# Patient Record
Sex: FEMALE | Race: WHITE | NOT HISPANIC OR LATINO | Employment: OTHER | URBAN - METROPOLITAN AREA
[De-identification: names, ages, dates, MRNs, and addresses within clinical notes are randomized per-mention and may not be internally consistent; named-entity substitution may affect disease eponyms.]

---

## 2017-08-28 ENCOUNTER — HOSPITAL ENCOUNTER (OUTPATIENT)
Dept: OTHER | Facility: HOSPITAL | Age: 52
Discharge: HOME | End: 2017-08-28
Attending: FAMILY MEDICINE | Admitting: FAMILY MEDICINE

## 2019-04-04 ENCOUNTER — OFFICE (AMBULATORY)
Dept: URBAN - METROPOLITAN AREA PATHOLOGY 4 | Facility: PATHOLOGY | Age: 54
End: 2019-04-04
Payer: COMMERCIAL

## 2019-04-04 ENCOUNTER — ON CAMPUS - OUTPATIENT (AMBULATORY)
Dept: URBAN - METROPOLITAN AREA HOSPITAL 2 | Facility: HOSPITAL | Age: 54
End: 2019-04-04
Payer: COMMERCIAL

## 2019-04-04 VITALS
HEART RATE: 65 BPM | HEART RATE: 70 BPM | SYSTOLIC BLOOD PRESSURE: 121 MMHG | SYSTOLIC BLOOD PRESSURE: 110 MMHG | HEART RATE: 50 BPM | SYSTOLIC BLOOD PRESSURE: 95 MMHG | OXYGEN SATURATION: 98 % | OXYGEN SATURATION: 99 % | HEIGHT: 65 IN | DIASTOLIC BLOOD PRESSURE: 66 MMHG | DIASTOLIC BLOOD PRESSURE: 60 MMHG | RESPIRATION RATE: 16 BRPM | HEART RATE: 59 BPM | OXYGEN SATURATION: 100 % | DIASTOLIC BLOOD PRESSURE: 70 MMHG | TEMPERATURE: 97.5 F | OXYGEN SATURATION: 97 % | HEART RATE: 54 BPM | SYSTOLIC BLOOD PRESSURE: 100 MMHG | WEIGHT: 167 LBS | SYSTOLIC BLOOD PRESSURE: 94 MMHG | HEART RATE: 63 BPM | DIASTOLIC BLOOD PRESSURE: 63 MMHG | DIASTOLIC BLOOD PRESSURE: 59 MMHG | DIASTOLIC BLOOD PRESSURE: 71 MMHG | HEART RATE: 58 BPM | DIASTOLIC BLOOD PRESSURE: 76 MMHG | DIASTOLIC BLOOD PRESSURE: 54 MMHG | SYSTOLIC BLOOD PRESSURE: 109 MMHG | SYSTOLIC BLOOD PRESSURE: 102 MMHG

## 2019-04-04 DIAGNOSIS — K64.1 SECOND DEGREE HEMORRHOIDS: ICD-10-CM

## 2019-04-04 DIAGNOSIS — K62.89 OTHER SPECIFIED DISEASES OF ANUS AND RECTUM: ICD-10-CM

## 2019-04-04 DIAGNOSIS — Z12.11 ENCOUNTER FOR SCREENING FOR MALIGNANT NEOPLASM OF COLON: ICD-10-CM

## 2019-04-04 DIAGNOSIS — D12.2 BENIGN NEOPLASM OF ASCENDING COLON: ICD-10-CM

## 2019-04-04 LAB
GI HISTOLOGY: A. UNSPECIFIED: (no result)
GI HISTOLOGY: PDF REPORT: (no result)

## 2019-04-04 PROCEDURE — 88305 TISSUE EXAM BY PATHOLOGIST: CPT | Mod: 33 | Performed by: INTERNAL MEDICINE

## 2019-04-04 PROCEDURE — 45380 COLONOSCOPY AND BIOPSY: CPT | Mod: 33 | Performed by: INTERNAL MEDICINE

## 2019-12-18 ENCOUNTER — OFFICE VISIT (OUTPATIENT)
Dept: FAMILY MEDICINE CLINIC | Facility: CLINIC | Age: 54
End: 2019-12-18

## 2019-12-18 VITALS
OXYGEN SATURATION: 99 % | SYSTOLIC BLOOD PRESSURE: 132 MMHG | TEMPERATURE: 96.8 F | HEIGHT: 65 IN | BODY MASS INDEX: 29.12 KG/M2 | RESPIRATION RATE: 16 BRPM | WEIGHT: 174.8 LBS | HEART RATE: 55 BPM | DIASTOLIC BLOOD PRESSURE: 60 MMHG

## 2019-12-18 DIAGNOSIS — K21.9 GASTROESOPHAGEAL REFLUX DISEASE, ESOPHAGITIS PRESENCE NOT SPECIFIED: ICD-10-CM

## 2019-12-18 DIAGNOSIS — Z87.891 HISTORY OF TOBACCO USE: ICD-10-CM

## 2019-12-18 DIAGNOSIS — I10 ESSENTIAL HYPERTENSION: ICD-10-CM

## 2019-12-18 DIAGNOSIS — E66.3 OVERWEIGHT: ICD-10-CM

## 2019-12-18 DIAGNOSIS — L40.9 PSORIASIS: Primary | ICD-10-CM

## 2019-12-18 DIAGNOSIS — I47.1 SUPRAVENTRICULAR TACHYCARDIA (HCC): ICD-10-CM

## 2019-12-18 PROBLEM — B37.31 CANDIDIASIS OF VULVA AND VAGINA: Status: ACTIVE | Noted: 2019-12-18

## 2019-12-18 PROBLEM — K58.9 IRRITABLE BOWEL SYNDROME: Status: ACTIVE | Noted: 2019-12-18

## 2019-12-18 PROBLEM — Z12.11 COLON CANCER SCREENING: Status: ACTIVE | Noted: 2019-12-18

## 2019-12-18 PROBLEM — F43.0 ACUTE CRISIS REACTION: Status: ACTIVE | Noted: 2019-12-18

## 2019-12-18 PROBLEM — E78.5 HYPERLIPIDEMIA: Status: ACTIVE | Noted: 2019-12-18

## 2019-12-18 PROBLEM — J30.9 ALLERGIC RHINITIS: Status: ACTIVE | Noted: 2019-12-18

## 2019-12-18 PROCEDURE — 99214 OFFICE O/P EST MOD 30 MIN: CPT | Performed by: FAMILY MEDICINE

## 2019-12-18 RX ORDER — METOPROLOL SUCCINATE 50 MG/1
50 TABLET, EXTENDED RELEASE ORAL DAILY
Refills: 3 | COMMUNITY
Start: 2019-11-25 | End: 2019-12-18 | Stop reason: SDUPTHER

## 2019-12-18 RX ORDER — OLOPATADINE HYDROCHLORIDE 2 MG/ML
SOLUTION/ DROPS OPHTHALMIC
Refills: 12 | COMMUNITY
Start: 2019-11-15 | End: 2020-12-22

## 2019-12-18 RX ORDER — OMEPRAZOLE 40 MG/1
CAPSULE, DELAYED RELEASE ORAL DAILY
COMMUNITY
Start: 2014-10-17 | End: 2020-12-22

## 2019-12-18 RX ORDER — METOPROLOL SUCCINATE 50 MG/1
50 TABLET, EXTENDED RELEASE ORAL DAILY
Qty: 90 TABLET | Refills: 3 | Status: SHIPPED | OUTPATIENT
Start: 2019-12-18 | End: 2020-12-16

## 2020-02-01 ENCOUNTER — TELEPHONE (OUTPATIENT)
Dept: FAMILY MEDICINE CLINIC | Facility: CLINIC | Age: 55
End: 2020-02-01

## 2020-12-04 ENCOUNTER — TELEPHONE (OUTPATIENT)
Dept: FAMILY MEDICINE CLINIC | Facility: CLINIC | Age: 55
End: 2020-12-04

## 2020-12-04 NOTE — TELEPHONE ENCOUNTER
Patient called and said that she sees you once a year and she has an gi on 12/18. She does not want to come in and be exposed and is asking if this can be done in a telephone visit?

## 2020-12-16 DIAGNOSIS — I10 ESSENTIAL HYPERTENSION: ICD-10-CM

## 2020-12-16 RX ORDER — METOPROLOL SUCCINATE 50 MG/1
TABLET, EXTENDED RELEASE ORAL
Qty: 30 TABLET | Refills: 0 | Status: SHIPPED | OUTPATIENT
Start: 2020-12-16 | End: 2020-12-22 | Stop reason: SDUPTHER

## 2020-12-22 ENCOUNTER — OFFICE VISIT (OUTPATIENT)
Dept: FAMILY MEDICINE CLINIC | Facility: CLINIC | Age: 55
End: 2020-12-22

## 2020-12-22 VITALS — WEIGHT: 187 LBS | HEIGHT: 65 IN | BODY MASS INDEX: 31.16 KG/M2

## 2020-12-22 DIAGNOSIS — E66.09 CLASS 1 OBESITY DUE TO EXCESS CALORIES WITH SERIOUS COMORBIDITY AND BODY MASS INDEX (BMI) OF 31.0 TO 31.9 IN ADULT: ICD-10-CM

## 2020-12-22 DIAGNOSIS — I47.1 SUPRAVENTRICULAR TACHYCARDIA (HCC): Primary | ICD-10-CM

## 2020-12-22 DIAGNOSIS — Z87.891 HISTORY OF TOBACCO USE: ICD-10-CM

## 2020-12-22 DIAGNOSIS — L40.9 PSORIASIS: ICD-10-CM

## 2020-12-22 DIAGNOSIS — I10 ESSENTIAL HYPERTENSION: ICD-10-CM

## 2020-12-22 PROBLEM — H04.9: Status: ACTIVE | Noted: 2019-12-20

## 2020-12-22 PROBLEM — E66.811 CLASS 1 OBESITY DUE TO EXCESS CALORIES WITH SERIOUS COMORBIDITY AND BODY MASS INDEX (BMI) OF 31.0 TO 31.9 IN ADULT: Status: ACTIVE | Noted: 2019-12-18

## 2020-12-22 PROCEDURE — 99214 OFFICE O/P EST MOD 30 MIN: CPT | Performed by: FAMILY MEDICINE

## 2020-12-22 RX ORDER — METOPROLOL SUCCINATE 50 MG/1
50 TABLET, EXTENDED RELEASE ORAL DAILY
Qty: 90 TABLET | Refills: 3 | Status: SHIPPED | OUTPATIENT
Start: 2020-12-22 | End: 2021-12-20 | Stop reason: SDUPTHER

## 2020-12-22 RX ORDER — ASPIRIN 81 MG/1
TABLET, CHEWABLE ORAL
COMMUNITY

## 2020-12-22 NOTE — PROGRESS NOTES
Subjective   Corinna Hooks is a 55 y.o. female.     Chief Complaint   Patient presents with   • Rapid Heart Rate       Hypertension  This is a chronic problem. The current episode started more than 1 year ago. The problem is controlled. Associated symptoms include palpitations. Pertinent negatives include no chest pain or shortness of breath. Risk factors for coronary artery disease include obesity. Current antihypertension treatment includes beta blockers. There are no compliance problems.             I personally reviewed and updated the patient's allergies, medications, problem list, and past medical, surgical, social, and family history. I have reviewed and confirmed the accuracy of the History of Present Illness and Review of Symptoms as documented by the MA/LPN/RN. Harris Shane MD    Family History   Problem Relation Age of Onset   • Heart disease Mother    • Diabetes Mother    • Tremor Father    • Heart disease Brother    • Cancer Maternal Aunt         Pancreatic       Social History     Tobacco Use   • Smoking status: Former Smoker     Packs/day: 0.50     Start date: 1987   • Smokeless tobacco: Never Used   • Tobacco comment: quit in 1989   Substance Use Topics   • Alcohol use: Yes     Frequency: Monthly or less   • Drug use: Never       Past Surgical History:   Procedure Laterality Date   • CERVICAL CONIZATION  2000    Comments: CARLOS III   • CHOLECYSTECTOMY  2003   • DILATATION AND CURETTAGE  2000    Of the Uterus.   • TUBAL ABDOMINAL LIGATION Bilateral 2008       Patient Active Problem List   Diagnosis   • Acute crisis reaction   • Allergic rhinitis   • Candidiasis of vulva and vagina   • Colon cancer screening   • Gastroesophageal reflux disease   • Hyperlipidemia   • Irritable bowel syndrome   • Class 1 obesity due to excess calories with serious comorbidity and body mass index (BMI) of 31.0 to 31.9 in adult   • Psoriasis   • Supraventricular tachycardia (CMS/HCC)   • Tinnitus   • Essential  "hypertension   • History of tobacco use   • Disorder of lacrimal system         Current Outpatient Medications:   •  aspirin 81 MG chewable tablet, , Disp: , Rfl:   •  fluocinonide (LIDEX) 0.05 % cream, Apply 1 application topically to the appropriate area as directed Every 8 (Eight) Hours., Disp: 60 g, Rfl: 5  •  metoprolol succinate XL (TOPROL-XL) 50 MG 24 hr tablet, Take 1 tablet by mouth Daily., Disp: 90 tablet, Rfl: 3         Review of Systems   Constitutional: Negative for chills and diaphoresis.   Eyes: Negative for visual disturbance.   Respiratory: Negative for shortness of breath.    Cardiovascular: Positive for palpitations. Negative for chest pain.   Gastrointestinal: Negative for abdominal pain and nausea.   Endocrine: Negative for polydipsia and polyphagia.   Musculoskeletal: Negative for neck stiffness.   Skin: Negative for color change and pallor.   Neurological: Negative for seizures and syncope.   Hematological: Negative for adenopathy.       I have reviewed and confirmed the accuracy of the ROS as documented by the MA/LPN/RN Harris Shane MD      Objective   Ht 165.1 cm (65\")   Wt 84.8 kg (187 lb)   LMP 12/04/2019   Breastfeeding No   BMI 31.12 kg/m²   BP Readings from Last 3 Encounters:   12/18/19 132/60   12/19/18 143/82   12/29/17 132/64     Wt Readings from Last 3 Encounters:   12/22/20 84.8 kg (187 lb)   12/18/19 79.3 kg (174 lb 12.8 oz)   12/19/18 82.8 kg (182 lb 9.6 oz)     Physical Exam    Recent Lab Results:    No results found for: HGBA1C     No results found for: LDL, LDLDIRECT  No results found for: CHOL  No results found for: TRIG  No results found for: HDL  No results found for: PSA  No results found for: WBC, HGB, HCT, MCV, PLT  No results found for: TSH, C2RMVIR, U6QKKMZ   No results found for: GLUCOSE, BUN, CREATININE, EGFRIFNONA, EGFRIFAFRI, BCR, K, CO2, CALCIUM, PROTENTOTREF, ALBUMIN, LABIL2, BILIRUBIN, AST, ALT  No results found for: THUAN, RF, SEDRATE   No results found " for: CRP   No results found for: IRON, TIBC, FERRITIN   No results found for: [id number]     Corinna Hooks consented to undergo a telephone visit today.  This format was necessitated by the current covid-19 virus pandemic.  22 minutes was spent on the phone today with Corinna discussing her acute concerns and chronic medical problems.  Assessment/Plan      Medications        Problem List         LOS    Hypertension.  Stable on metoprolol.  Discussed low-sodium diet.  Recommend blood work she states her OB/GYN draws for screening blood work for her.  SVT.  Clinically improved on Toprol currently, infrequent breakthrough episodes.  Avoid caffeine.   Follow-up recheck.  Stress testing/echo benign 2017.  Followed by Dr. Linda, consider cardiology follow-up.  Meniscus tear.  Much improved status post repair per Ortho Dr. Oliveira.  Psoriasis.  Stable on topical Rx.  Menopausal syndrome.  Discussed calcium and vitamin D.  Followed by OB/GYN, mammogram current.  Colon cancer screening.  Colonoscopy per [place] 2019 with polypectomy by 1.  Hyperlipidemia.  Mild elevation per her report.  Recommend fasting blood work she states her OB/GYN checks her cholesterol for her.  We will attempt to get records.  GERD.  Doing well on PPI.  Discussed calcium vitamin D.  Longstanding, recommend GI referral/EGD she states she will consider.  Recommend follow-up visit for comprehensive physical, screening tests      Problem List Items Addressed This Visit        Unprioritized    Class 1 obesity due to excess calories with serious comorbidity and body mass index (BMI) of 31.0 to 31.9 in adult    Psoriasis    Overview     stabke  Topical care discussed.         Relevant Medications    fluocinonide (LIDEX) 0.05 % cream    Supraventricular tachycardia (CMS/HCC) - Primary    Relevant Medications    metoprolol succinate XL (TOPROL-XL) 50 MG 24 hr tablet    Essential hypertension    Relevant Medications    metoprolol succinate XL (TOPROL-XL) 50 MG  24 hr tablet    History of tobacco use              Expected course, medications, and adverse effects discussed.  Call or return if worsening or persistent symptoms.  The complete contents of the Assessment and Plan as documented above have been reviewed and addressed by myself with the patient today as part of an ongoing evaluation / treatment plan.  If some of the documentation has been copied from a previous note and is unchanged it indicates that this problem / plan has been assessed today but is stable from a previous visit and no changes have been recommended.

## 2020-12-26 ENCOUNTER — TELEPHONE (OUTPATIENT)
Dept: FAMILY MEDICINE CLINIC | Facility: CLINIC | Age: 55
End: 2020-12-26

## 2021-01-19 DIAGNOSIS — K21.9 GASTROESOPHAGEAL REFLUX DISEASE, UNSPECIFIED WHETHER ESOPHAGITIS PRESENT: Primary | ICD-10-CM

## 2021-01-19 RX ORDER — OMEPRAZOLE 40 MG/1
40 CAPSULE, DELAYED RELEASE ORAL DAILY
Qty: 90 CAPSULE | Refills: 3 | Status: SHIPPED | OUTPATIENT
Start: 2021-01-19 | End: 2021-12-20 | Stop reason: SDUPTHER

## 2021-07-14 ENCOUNTER — OFFICE VISIT (OUTPATIENT)
Dept: FAMILY MEDICINE CLINIC | Facility: CLINIC | Age: 56
End: 2021-07-14

## 2021-07-14 VITALS
HEIGHT: 65 IN | WEIGHT: 190.4 LBS | OXYGEN SATURATION: 99 % | SYSTOLIC BLOOD PRESSURE: 114 MMHG | HEART RATE: 99 BPM | BODY MASS INDEX: 31.72 KG/M2 | DIASTOLIC BLOOD PRESSURE: 80 MMHG | RESPIRATION RATE: 18 BRPM | TEMPERATURE: 97.1 F

## 2021-07-14 DIAGNOSIS — R07.9 CHEST PAIN, UNSPECIFIED TYPE: ICD-10-CM

## 2021-07-14 DIAGNOSIS — R10.2 PELVIC PAIN: ICD-10-CM

## 2021-07-14 DIAGNOSIS — Z01.818 PRE-OP EXAM: Primary | ICD-10-CM

## 2021-07-14 DIAGNOSIS — Z87.891 HISTORY OF TOBACCO USE: ICD-10-CM

## 2021-07-14 DIAGNOSIS — E66.09 CLASS 1 OBESITY DUE TO EXCESS CALORIES WITH SERIOUS COMORBIDITY AND BODY MASS INDEX (BMI) OF 31.0 TO 31.9 IN ADULT: ICD-10-CM

## 2021-07-14 PROCEDURE — 99213 OFFICE O/P EST LOW 20 MIN: CPT | Performed by: FAMILY MEDICINE

## 2021-07-14 PROCEDURE — 93000 ELECTROCARDIOGRAM COMPLETE: CPT | Performed by: FAMILY MEDICINE

## 2021-07-14 NOTE — PROGRESS NOTES
Subjective   Corinna Hooks is a 56 y.o. female.     Chief Complaint   Patient presents with   • pre-op clearence     DNC @ [facility]    • Pelvic Pain       Pre-Operative Exam:  The procedure scheduled is a DNC . The surgery is scheduled for 7/30/2021 and is to be done at [facility]. The surgeon is Anitra Hargrove. Chief complaint is pelvic pain and polyps.. Recent symptoms include pelvic pain. Pertinent medical history includes fibroid tumors and thickened endometrial lining, had CIN3 in 2001 and had a cold knife comb with DNC at that time and reports no issues since. Pertinent family history includes ovarian cancer. After surgery the patient plans to recover at home with family.   Pelvic Pain  The patient's primary symptoms include pelvic pain. This is a chronic problem. The current episode started more than 1 year ago. The problem occurs intermittently. The problem has been waxing and waning. The patient is experiencing no pain. She is not pregnant. Pertinent negatives include no abdominal pain, anorexia, back pain, chills, constipation, diarrhea, discolored urine, dysuria, fever, flank pain, frequency, headaches, hematuria, joint pain, joint swelling, nausea, painful intercourse, rash, sore throat, urgency or vomiting. Nothing aggravates the symptoms. She has tried nothing for the symptoms. The treatment provided no relief. She uses tubal ligation for contraception. Her past medical history is significant for ovarian cysts.            I personally reviewed and updated the patient's allergies, medications, problem list, and past medical, surgical, social, and family history. I have reviewed and confirmed the accuracy of the History of Present Illness and Review of Symptoms as documented by the MA/LISA/RN. Harris Shane MD    Family History   Problem Relation Age of Onset   • Heart disease Mother    • Diabetes Mother    • Tremor Father    • Heart disease Brother    • Cancer Maternal Aunt          Pancreatic       Social History     Tobacco Use   • Smoking status: Former Smoker     Packs/day: 0.50     Start date:      Quit date:      Years since quittin.5   • Smokeless tobacco: Never Used   • Tobacco comment: quit in    Vaping Use   • Vaping Use: Never used   Substance Use Topics   • Alcohol use: Yes   • Drug use: Never       Past Surgical History:   Procedure Laterality Date   • CERVICAL CONIZATION      Comments: CARLOS III   • CHOLECYSTECTOMY     • DILATATION AND CURETTAGE      Of the Uterus.   • KNEE SURGERY Left    • TUBAL ABDOMINAL LIGATION Bilateral        Patient Active Problem List   Diagnosis   • Acute crisis reaction   • Allergic rhinitis   • Candidiasis of vulva and vagina   • Colon cancer screening   • Gastroesophageal reflux disease   • Hyperlipidemia   • Irritable bowel syndrome   • Class 1 obesity due to excess calories with serious comorbidity and body mass index (BMI) of 31.0 to 31.9 in adult   • Psoriasis   • Supraventricular tachycardia (CMS/HCC)   • Tinnitus   • Essential hypertension   • History of tobacco use   • Disorder of lacrimal system         Current Outpatient Medications:   •  aspirin 81 MG chewable tablet, , Disp: , Rfl:   •  metoprolol succinate XL (TOPROL-XL) 50 MG 24 hr tablet, Take 1 tablet by mouth Daily., Disp: 90 tablet, Rfl: 3  •  omeprazole (priLOSEC) 40 MG capsule, Take 1 capsule by mouth Daily. (Patient taking differently: Take 40 mg by mouth As Needed.), Disp: 90 capsule, Rfl: 3         Review of Systems   Constitutional: Negative for chills, diaphoresis and fever.   HENT: Negative for sore throat.    Eyes: Negative for visual disturbance.   Respiratory: Negative for shortness of breath.    Cardiovascular: Negative for chest pain and palpitations.   Gastrointestinal: Negative for abdominal pain, anorexia, constipation, diarrhea, nausea and vomiting.   Endocrine: Negative for polydipsia and polyphagia.   Genitourinary: Positive for  "pelvic pain. Negative for dysuria, flank pain, frequency, hematuria and urgency.   Musculoskeletal: Negative for back pain, joint pain and neck stiffness.   Skin: Negative for color change, pallor and rash.   Neurological: Negative for seizures and syncope.   Hematological: Negative for adenopathy.   Psychiatric/Behavioral: Negative for hallucinations and suicidal ideas.       I have reviewed and confirmed the accuracy of the ROS as documented by the MA/LPN/RN Harris Shane MD      Objective   /80   Pulse 99   Temp 97.1 °F (36.2 °C)   Resp 18   Ht 165.1 cm (65\")   Wt 86.4 kg (190 lb 6.4 oz)   LMP 12/04/2019   SpO2 99%   BMI 31.68 kg/m²   BP Readings from Last 3 Encounters:   07/14/21 114/80   12/18/19 132/60   12/19/18 143/82     Wt Readings from Last 3 Encounters:   07/14/21 86.4 kg (190 lb 6.4 oz)   12/22/20 84.8 kg (187 lb)   12/18/19 79.3 kg (174 lb 12.8 oz)     Physical Exam  Constitutional:       Appearance: Normal appearance. She is well-developed. She is not diaphoretic.   Cardiovascular:      Rate and Rhythm: Normal rate and regular rhythm.      Pulses: Normal pulses.      Heart sounds: Normal heart sounds, S1 normal and S2 normal. No murmur heard.   No friction rub. No gallop.    Pulmonary:      Effort: Pulmonary effort is normal. No accessory muscle usage.      Breath sounds: Normal breath sounds. No stridor. No decreased breath sounds, wheezing, rhonchi or rales.   Abdominal:      General: Bowel sounds are normal. There is no distension.      Palpations: Abdomen is soft. Abdomen is not rigid. There is no mass or pulsatile mass.      Tenderness: There is no abdominal tenderness. There is no guarding or rebound. Negative signs include Allen's sign.      Hernia: No hernia is present.   Skin:     General: Skin is warm and dry.      Coloration: Skin is not pale.   Neurological:      Mental Status: She is alert and oriented to person, place, and time.      Coordination: Coordination normal.     "  Gait: Gait normal.         Data / Lab Results:    No results found for: HGBA1C     No results found for: LDL, LDLDIRECT  No results found for: CHOL  No results found for: TRIG  No results found for: HDL  No results found for: PSA  No results found for: WBC, HGB, HCT, MCV, PLT  No results found for: TSH, [id number], I4PEWXV   No results found for: GLUCOSE, BUN, CREATININE, EGFRIFNONA, EGFRIFAFRI, BCR, K, CO2, CALCIUM, PROTENTOTREF, ALBUMIN, LABIL2, BILIRUBIN, AST, ALT  No results found for: THUAN, RF, SEDRATE   No results found for: CRP   No results found for: IRON, TIBC, FERRITIN   No results found for: [id number]       Assessment/Plan      Medications        Problem List         LOS    Preop clearance.  She has an upcoming D&C planned secondary to abnormal pelvic ultrasound.  EKG benign today.  Has upcoming screening blood work scheduled per OB/GYN.  Benign exam today.  Cleared for surgery.  Hypertension.  Stable on metoprolol.  Discussed low-sodium diet.  Recommend blood work she states her OB/GYN draws for screening blood work for her.  SVT.  Clinically improved on Toprol currently, infrequent breakthrough episodes.  Avoid caffeine.   Follow-up recheck.  Stress testing/echo benign 2017.  Followed by Dr. Linda, consider cardiology follow-up.  Meniscus tear.  Much improved status post repair per Ortho Dr. Oliveira.  Psoriasis.  Stable on topical Rx.  Menopausal syndrome.  Discussed calcium and vitamin D.  Followed by OB/GYN, mammogram current.  Colon cancer screening.  Colonoscopy per [facility] 2019 with polypectomy by 1.  Hyperlipidemia.  Mild elevation per her report.  Recommend fasting blood work she states her OB/GYN checks her cholesterol for her.  We will attempt to get records.  GERD.  Doing well on PPI.  Discussed calcium vitamin D.  Longstanding, recommend GI referral/EGD she states she will consider.  Recommend follow-up visit for comprehensive physical, screening tests        Diagnoses and all orders for this  visit:    1. Pre-op exam (Primary)    2. Pelvic pain    3. History of tobacco use    4. Class 1 obesity due to excess calories with serious comorbidity and body mass index (BMI) of 31.0 to 31.9 in adult    5. Chest pain, unspecified type  -     Cancel: ECG 12 Lead; Future  -     ECG 12 Lead              Expected course, medications, and adverse effects discussed.  Call or return if worsening or persistent symptoms.  I wore protective equipment throughout this patient encounter including a mask, gloves, and eye protection.  Hand hygiene was performed before donning protective equipment and after removal when leaving the room. The complete contents of the Assessment and Plan and Data/Lab Results as documented above have been reviewed and addressed by myself with the patient today as part of an ongoing evaluation / treatment plan.  If some of the documentation has been copied from a previous note and is unchanged it indicates that this problem / plan has been assessed today but is stable from a previous visit and no changes have been recommended.

## 2021-08-11 ENCOUNTER — TELEPHONE (OUTPATIENT)
Dept: FAMILY MEDICINE CLINIC | Facility: CLINIC | Age: 56
End: 2021-08-11

## 2021-12-20 ENCOUNTER — OFFICE VISIT (OUTPATIENT)
Dept: FAMILY MEDICINE CLINIC | Facility: CLINIC | Age: 56
End: 2021-12-20

## 2021-12-20 VITALS
WEIGHT: 204 LBS | DIASTOLIC BLOOD PRESSURE: 70 MMHG | BODY MASS INDEX: 33.99 KG/M2 | HEIGHT: 65 IN | SYSTOLIC BLOOD PRESSURE: 102 MMHG | HEART RATE: 84 BPM | RESPIRATION RATE: 18 BRPM | OXYGEN SATURATION: 98 % | TEMPERATURE: 97.1 F

## 2021-12-20 DIAGNOSIS — Z00.01 ENCOUNTER FOR WELL ADULT EXAM WITH ABNORMAL FINDINGS: Primary | ICD-10-CM

## 2021-12-20 DIAGNOSIS — L40.9 PSORIASIS: ICD-10-CM

## 2021-12-20 DIAGNOSIS — I47.1 SUPRAVENTRICULAR TACHYCARDIA (HCC): ICD-10-CM

## 2021-12-20 DIAGNOSIS — K21.9 GASTROESOPHAGEAL REFLUX DISEASE, UNSPECIFIED WHETHER ESOPHAGITIS PRESENT: ICD-10-CM

## 2021-12-20 DIAGNOSIS — Z12.11 COLON CANCER SCREENING: ICD-10-CM

## 2021-12-20 DIAGNOSIS — Z87.891 HISTORY OF TOBACCO USE: ICD-10-CM

## 2021-12-20 DIAGNOSIS — I10 ESSENTIAL HYPERTENSION: ICD-10-CM

## 2021-12-20 DIAGNOSIS — E66.09 CLASS 1 OBESITY DUE TO EXCESS CALORIES WITH SERIOUS COMORBIDITY AND BODY MASS INDEX (BMI) OF 31.0 TO 31.9 IN ADULT: ICD-10-CM

## 2021-12-20 PROCEDURE — 99396 PREV VISIT EST AGE 40-64: CPT | Performed by: FAMILY MEDICINE

## 2021-12-20 PROCEDURE — 99214 OFFICE O/P EST MOD 30 MIN: CPT | Performed by: FAMILY MEDICINE

## 2021-12-20 RX ORDER — MELOXICAM 15 MG/1
TABLET ORAL
COMMUNITY
Start: 2021-10-11 | End: 2022-12-21

## 2021-12-20 RX ORDER — METOPROLOL SUCCINATE 50 MG/1
50 TABLET, EXTENDED RELEASE ORAL DAILY
Qty: 90 TABLET | Refills: 3 | Status: SHIPPED | OUTPATIENT
Start: 2021-12-20 | End: 2022-05-16 | Stop reason: SDUPTHER

## 2021-12-20 RX ORDER — OMEPRAZOLE 40 MG/1
40 CAPSULE, DELAYED RELEASE ORAL DAILY
Qty: 90 CAPSULE | Refills: 3 | Status: SHIPPED | OUTPATIENT
Start: 2021-12-20 | End: 2022-12-21 | Stop reason: SDUPTHER

## 2021-12-20 RX ORDER — ERYTHROMYCIN 5 MG/G
OINTMENT OPHTHALMIC
COMMUNITY
Start: 2021-11-04 | End: 2022-12-21

## 2021-12-20 NOTE — PROGRESS NOTES
Subjective   Corinna Hooks is a 57 y.o. female.     Chief Complaint   Patient presents with   • Annual Exam   • Hypertension       The patient is here: for coordination of medical care to discuss health maintenance and disease prevention to follow up on multiple medical problems.  Last comprehensive physical is unknown.  Previous physical was performed by  Harris Shane MD  Overall has: moderate activity with work/home activities, good appetite, decreased energy level and is sleeping poorly. Nutrition: eating a variety of foods. Last tetanus shot was 10/28/2013. Patient is doing routine self breast exams: monthly Last colonoscopy was 19 at [facility] by Dr Jaffe and was reported with benign polyps. Her last pap smear was May 2021    Hypertension  This is a chronic problem. The current episode started more than 1 year ago. The problem is controlled. Associated symptoms include palpitations. Pertinent negatives include no chest pain or shortness of breath. Risk factors for coronary artery disease include obesity. Current antihypertension treatment includes beta blockers. There are no compliance problems.         Recent Hospitalizations:  No hospitalization(s) within the last year..  ccc      I personally reviewed and updated the patient's allergies, medications, problem list, and past medical, surgical, social, and family history. I have reviewed and confirmed the accuracy of the HPI and ROS as documented by the MA/LPN/RN Harris Shane MD    Family History   Problem Relation Age of Onset   • Heart disease Mother    • Diabetes Mother    • Tremor Father    • Heart disease Brother    • Cancer Maternal Aunt         Pancreatic       Social History     Tobacco Use   • Smoking status: Former Smoker     Packs/day: 0.50     Start date:      Quit date:      Years since quittin.7   • Smokeless tobacco: Never Used   • Tobacco comment: quit in    Vaping Use   • Vaping Use: Never used   Substance  Use Topics   • Alcohol use: Yes   • Drug use: Never       Past Surgical History:   Procedure Laterality Date   • CERVICAL CONIZATION  2000    Comments: CARLOS III   • CHOLECYSTECTOMY  2003   • D & C WITH SUCTION  07/2021   • DILATATION AND CURETTAGE  2000    Of the Uterus.   • KNEE SURGERY Left    • TUBAL ABDOMINAL LIGATION Bilateral 2008       Patient Active Problem List   Diagnosis   • Acute crisis reaction   • Allergic rhinitis   • Candidiasis of vulva and vagina   • Colon cancer screening   • Gastroesophageal reflux disease   • Hyperlipidemia   • Irritable bowel syndrome   • Class 1 obesity due to excess calories with serious comorbidity and body mass index (BMI) of 31.0 to 31.9 in adult   • Psoriasis   • Supraventricular tachycardia (HCC)   • Tinnitus   • Essential hypertension   • History of tobacco use   • Disorder of lacrimal system   • Encounter for well adult exam with abnormal findings         Current Outpatient Medications:   •  aspirin 81 MG chewable tablet, , Disp: , Rfl:   •  meloxicam (MOBIC) 15 MG tablet, , Disp: , Rfl:   •  omeprazole (priLOSEC) 40 MG capsule, Take 1 capsule by mouth Daily., Disp: 90 capsule, Rfl: 3  •  erythromycin (ROMYCIN) 5 MG/GM ophthalmic ointment, , Disp: , Rfl:   •  fluocinonide (LIDEX) 0.05 % cream, Apply 1 application topically to the appropriate area as directed Every 8 (Eight) Hours., Disp: 60 g, Rfl: 5  •  metoprolol succinate XL (TOPROL-XL) 50 MG 24 hr tablet, Take 1 tablet by mouth Daily., Disp: 90 tablet, Rfl: 3    Review of Systems   Constitutional: Negative for chills and diaphoresis.   HENT: Negative for trouble swallowing and voice change.    Eyes: Negative for visual disturbance.   Respiratory: Negative for shortness of breath.    Cardiovascular: Positive for palpitations. Negative for chest pain.   Gastrointestinal: Negative for abdominal pain and nausea.   Endocrine: Negative for polydipsia and polyphagia.   Genitourinary: Negative for hematuria.  "  Musculoskeletal: Negative for neck stiffness.   Skin: Negative for color change and pallor.   Allergic/Immunologic: Negative for immunocompromised state.   Neurological: Negative for seizures and syncope.   Hematological: Negative for adenopathy.   Psychiatric/Behavioral: Negative for sleep disturbance and suicidal ideas.       I have reviewed and confirmed the accuracy of the ROS as documented by the MA/LPN/RN Harris Shane MD      Objective   /70   Pulse 84   Temp 97.1 °F (36.2 °C)   Resp 18   Ht 165.1 cm (65\")   Wt 92.5 kg (204 lb)   LMP 12/04/2019   SpO2 98%   Breastfeeding No   BMI 33.95 kg/m²   BP Readings from Last 3 Encounters:   12/20/21 102/70   07/14/21 114/80   12/18/19 132/60     Wt Readings from Last 3 Encounters:   12/20/21 92.5 kg (204 lb)   07/14/21 86.4 kg (190 lb 6.4 oz)   12/22/20 84.8 kg (187 lb)     Physical Exam  Constitutional:       Appearance: She is well-developed. She is not diaphoretic.   HENT:      Head: Normocephalic.      Right Ear: Tympanic membrane, ear canal and external ear normal.      Left Ear: Tympanic membrane, ear canal and external ear normal.      Nose: Nose normal.   Eyes:      General: Lids are normal.      Conjunctiva/sclera: Conjunctivae normal.      Pupils: Pupils are equal, round, and reactive to light.   Neck:      Thyroid: No thyromegaly.      Vascular: No carotid bruit or JVD.      Trachea: No tracheal deviation.   Cardiovascular:      Rate and Rhythm: Normal rate and regular rhythm.      Heart sounds: Normal heart sounds. No murmur heard.  No friction rub. No gallop.    Pulmonary:      Effort: Pulmonary effort is normal.      Breath sounds: Normal breath sounds. No stridor. No decreased breath sounds, wheezing or rales.   Abdominal:      General: Bowel sounds are normal. There is no distension.      Palpations: Abdomen is soft. There is no mass.      Tenderness: There is no abdominal tenderness. There is no guarding or rebound.      Hernia: No " hernia is present.   Lymphadenopathy:      Head:      Right side of head: No submental, submandibular, tonsillar, preauricular, posterior auricular or occipital adenopathy.      Left side of head: No submental, submandibular, tonsillar, preauricular, posterior auricular or occipital adenopathy.      Cervical: No cervical adenopathy.   Skin:     General: Skin is warm and dry.      Coloration: Skin is not pale.   Neurological:      Mental Status: She is alert and oriented to person, place, and time.      Cranial Nerves: No cranial nerve deficit.      Sensory: No sensory deficit.      Coordination: Coordination normal.      Gait: Gait normal.      Deep Tendon Reflexes: Reflexes are normal and symmetric.         Data / Lab Results:    No results found for: HGBA1C     No results found for: LDL, LDLDIRECT  No results found for: CHOL  No results found for: TRIG  No results found for: HDL  No results found for: PSA  No results found for: WBC, HGB, HCT, MCV, PLT  No results found for: TSH, N2XCFXU, V4ZUOZK   No results found for: GLUCOSE, BUN, CREATININE, EGFRIFNONA, EGFRIFAFRI, BCR, K, CO2, CALCIUM, PROTENTOTREF, ALBUMIN, LABIL2, BILIRUBIN, AST, ALT  No results found for: THUAN, RF, SEDRATE   No results found for: CRP   No results found for: IRON, TIBC, FERRITIN   No results found for: [id number]     Age-appropriate Screening Schedule:  Refer to the list below for future screening recommendations based on patient's age, sex and/or medical conditions. Orders for these recommended tests are listed in the plan section. The patient has been provided with a written plan.    Health Maintenance   Topic Date Due   • ZOSTER VACCINE (1 of 2) Never done   • LIPID PANEL  Never done   • PAP SMEAR  12/21/2021   • INFLUENZA VACCINE  10/01/2022   • MAMMOGRAM  08/25/2023   • TDAP/TD VACCINES (2 - Td or Tdap) 10/28/2023           Assessment & Plan      Medications        Problem List         LOS    Physical.  Doing well, vaccines current.   Discussed coated baby aspirin daily.  Discussed calcium and vitamin D.  Discussed health maintenance, screening test, and lifestyle modification.  Followed by OB/GYN Dr. Alina Rivera, mammogram current.  Uterine polyp.  Status post D&C 6/29, path benign, followed by OB/GYN.  SVT.  Clinically improved on Toprol currently, infrequent breakthrough episodes.  Avoid caffeine.   Follow-up recheck.  Stress testing/echo benign 2017.  Followed by Dr. Linda, consider cardiology follow-up.  OA knee.  Persistent pain, stiffness.  History of Meniscus tear, status post repair per Ortho Dr. Oliveira.  Psoriasis.  Stable on topical Rx.  Menopausal syndrome.  Discussed calcium and vitamin D.  Followed by OB/GYN, mammogram current.  Colon cancer screening.  Colonoscopy per [facility] 2019 with polypectomy by 1.  Repeat planned 5 years.  Hyperlipidemia.  Mild elevation per her report.  Recommend fasting blood work she states her OB/GYN checks her cholesterol for her.  We will attempt to get records.  GERD.  Doing well on PPI.  Discussed calcium vitamin D.  Longstanding, recommend GI referral/EGD she states she will consider.        Diagnoses and all orders for this visit:    1. Encounter for well adult exam with abnormal findings (Primary)    2. Class 1 obesity due to excess calories with serious comorbidity and body mass index (BMI) of 31.0 to 31.9 in adult  Assessment & Plan:  Patient's (Body mass index is 33.95 kg/m².) indicates that they are obese (BMI >30) with health conditions that include hypertension, dyslipidemias and GERD . Weight is unchanged. BMI is is above average; BMI management plan is completed. We discussed portion control and increasing exercise.       3. History of tobacco use    4. Colon cancer screening    5. Psoriasis  -     fluocinonide (LIDEX) 0.05 % cream; Apply 1 application topically to the appropriate area as directed Every 8 (Eight) Hours.  Dispense: 60 g; Refill: 5    6. Gastroesophageal reflux disease,  unspecified whether esophagitis present  -     omeprazole (priLOSEC) 40 MG capsule; Take 1 capsule by mouth Daily.  Dispense: 90 capsule; Refill: 3    7. Essential hypertension  -     Discontinue: metoprolol succinate XL (TOPROL-XL) 50 MG 24 hr tablet; Take 1 tablet by mouth Daily.  Dispense: 90 tablet; Refill: 3    8. Supraventricular tachycardia (HCC)            Expected course, medications, and adverse effects discussed.  Call or return if worsening or persistent symptoms.  I wore protective equipment throughout this patient encounter including a mask, gloves, and eye protection.  Hand hygiene was performed before donning protective equipment and after removal when leaving the room. The complete contents of the Assessment and Plan and Data / Lab Results as documented above have been reviewed and addressed by myself with the patient today as part of an ongoing evaluation / treatment plan.  If some of the documentation has been copied from a previous note and is unchanged it indicates that this problem / plan has been assessed today but is stable from a previous visit and no changes have been recommended.

## 2021-12-29 ENCOUNTER — TELEPHONE (OUTPATIENT)
Dept: FAMILY MEDICINE CLINIC | Facility: CLINIC | Age: 56
End: 2021-12-29

## 2021-12-29 NOTE — TELEPHONE ENCOUNTER
----- Message from Corinna Hooks sent at 12/28/2021  4:42 PM EST -----  Regarding: Diagnoses question   Hi Dr Shane, this certainly is not a complaint,however it was the only “subject” to choose from to address this concern.     I reviewed my visit summaries and noticed on Dec 18, 2019 as well as Dec 20, 2021 I have been diagnosed with essential hypertension. I’ve never been told this. I’ve had to correct the medical assistants who do my vitals before you see me that I take Toprol for my SVT only. Every single time they ask me how my Toprol is helping my high blood pressure-then I have to remind them it’s for my SVT.     Also, I’ve never had high blood cholesterol or vulvar candidiasis, to my knowledge OR acute crisis. All of these are my “current diagnoses” according to my visit in 2019 and just last week.     Can you please review and remove these diagnoses? Please respond and let me know what’s going on. Thanks and Happy New Year! Corinna Hooks

## 2022-05-16 DIAGNOSIS — I10 ESSENTIAL HYPERTENSION: ICD-10-CM

## 2022-05-16 RX ORDER — METOPROLOL SUCCINATE 50 MG/1
50 TABLET, EXTENDED RELEASE ORAL DAILY
Qty: 90 TABLET | Refills: 3 | Status: SHIPPED | OUTPATIENT
Start: 2022-05-16 | End: 2022-12-21 | Stop reason: SDUPTHER

## 2022-05-16 NOTE — TELEPHONE ENCOUNTER
Caller: Corinna Hooks    Relationship: Self    Best call back number: [phone]    Requested Prescriptions:   Requested Prescriptions     Pending Prescriptions Disp Refills   • metoprolol succinate XL (TOPROL-XL) 50 MG 24 hr tablet 90 tablet 3     Sig: Take 1 tablet by mouth Daily.        Pharmacy where request should be sent: [facility] #[id number] - [place], [place] [address] - [phone]  - [phone]     Additional details provided by patient: ONLY TWO PILLS LEFT/NEEDING 1 MONTH SENT IN TO LOCAL PHARMACY    DID NOT GET HOME SHIPMENT    Does the patient have less than a 3 day supply:  [x] Yes  [] No    Radha Teran, Nuno Rep   05/16/22 09:21 EDT

## 2022-12-20 PROBLEM — Z12.39 SCREENING FOR MALIGNANT NEOPLASM OF BREAST: Status: ACTIVE | Noted: 2022-12-20

## 2022-12-20 NOTE — PROGRESS NOTES
Subjective   Corinna Hooks is a 57 y.o. female.     Chief Complaint   Patient presents with   • Annual Exam   • Hypertension   • Hyperlipidemia       The patient is here: to discuss health maintenance and disease prevention to follow up on multiple medical problems.  Last comprehensive physical was on 12/20/2021.  Previous physical was performed by  Harris Shane MD  Overall has: moderate activity with work/home activities, good appetite, good energy level, is sleeping well and returned to full activity. Nutrition: appropriate balanced diet, balanced diet and eating a variety of foods. Last tetanus shot was 10/28/2013. Patient is doing routine self breast exams: monthly Patient's last Pap Smear was on 12/21/2018.     Last Completed Mammogram          MAMMOGRAM (Every 2 Years) Next due on 8/25/2023 08/25/2021  SCANNED - MAMMO    08/24/2021  Outside Procedure: HC MAMMOGRAM SCREENING BILAT DIGITAL W CAD    11/06/2013   Mammogram component of  MAMMOGRAPHY    11/06/2013  SCANNED - MAMMO    04/04/2012  SCANNED - MAMMO    Only the first 5 history entries have been loaded, but more history exists.               Last Completed Colonoscopy          COLORECTAL CANCER SCREENING (COLONOSCOPY - Every 10 Years) Next due on 4/4/2029 04/04/2019   Colonoscopy component of  COLONOSCOPY    04/04/2019  SCANNED - COLONOSCOPY                Hypertension  This is a chronic problem. The current episode started more than 1 year ago. The problem is controlled. Associated symptoms include palpitations. Pertinent negatives include no chest pain or shortness of breath. Risk factors for coronary artery disease include obesity. Current antihypertension treatment includes beta blockers. There are no compliance problems.    Hyperlipidemia  This is a chronic problem. The current episode started more than 1 year ago. Pertinent negatives include no chest pain or shortness of breath. Current antihyperlipidemic treatment includes diet  change and exercise. Risk factors for coronary artery disease include hypertension and obesity.        Recent Hospitalizations:  No hospitalization(s) within the last year..  ccc      I personally reviewed and updated the patient's allergies, medications, problem list, and past medical, surgical, social, and family history. I have reviewed and confirmed the accuracy of the HPI and ROS as documented by the MA/LPN/RN Harris Shane MD    Family History   Problem Relation Age of Onset   • Heart disease Mother    • Diabetes Mother    • Hyperlipidemia Mother    • Tremor Father    • Stroke Father    • Heart disease Brother    • Cancer Maternal Aunt         Pancreatic       Social History     Tobacco Use   • Smoking status: Former     Packs/day: 0.50     Years: 2.00     Pack years: 1.00     Types: Cigarettes     Start date: 1987     Quit date: 1989     Years since quittin.0   • Smokeless tobacco: Never   • Tobacco comments:     quit in    Vaping Use   • Vaping Use: Never used   Substance Use Topics   • Alcohol use: Yes     Comment: Few glasses of wine about every 6 weeks   • Drug use: Never       Past Surgical History:   Procedure Laterality Date   • CARPAL TUNNEL RELEASE Right 2022    [place]   • CERVICAL CONIZATION      Comments: CARLOS III   • CHOLECYSTECTOMY     • COLONOSCOPY     • D & C WITH SUCTION  2021   • DILATATION AND CURETTAGE      Of the Uterus.   • JOINT REPLACEMENT  May 2022    Left TKR   • KNEE SURGERY Left    • SPINE SURGERY      Lumbar reduction   • TUBAL ABDOMINAL LIGATION Bilateral        Patient Active Problem List   Diagnosis   • Acute crisis reaction   • Allergic rhinitis   • Candidiasis of vulva and vagina   • Colon cancer screening   • Gastroesophageal reflux disease   • Hyperlipidemia   • Irritable bowel syndrome   • Class 2 severe obesity due to excess calories with serious comorbidity and body mass index (BMI) of 35.0 to 35.9 in adult ([facility])  "  • Psoriasis   • Supraventricular tachycardia (HCC)   • Tinnitus   • Essential hypertension   • History of tobacco use   • Disorder of lacrimal system   • Encounter for well adult exam with abnormal findings   • Screening for malignant neoplasm of breast         Current Outpatient Medications:   •  aspirin 81 MG chewable tablet, , Disp: , Rfl:   •  fluocinonide (LIDEX) 0.05 % cream, Apply 1 application topically to the appropriate area as directed Every 8 (Eight) Hours., Disp: 60 g, Rfl: 12  •  metoprolol succinate XL (TOPROL-XL) 50 MG 24 hr tablet, TAKE 1 Tablet BY MOUTH ONCE DAILY, Disp: 90 tablet, Rfl: 3  •  omeprazole (priLOSEC) 40 MG capsule, TAKE ONE CAPSULE BY MOUTH DAILY, Disp: 90 capsule, Rfl: 3  •  predniSONE (DELTASONE) 5 MG tablet, 40mg x 3 days, 20mg x 3 days, 10mg x 3 days, 5mg x 3 days, Disp: 45 tablet, Rfl: 0    Review of Systems   Constitutional: Negative for chills and diaphoresis.   Eyes: Negative for visual disturbance.   Respiratory: Negative for shortness of breath.    Cardiovascular: Positive for palpitations. Negative for chest pain.   Gastrointestinal: Negative for abdominal pain and nausea.   Endocrine: Negative for polydipsia and polyphagia.   Musculoskeletal: Negative for neck stiffness.   Skin: Negative for color change and pallor.   Neurological: Negative for seizures and syncope.   Hematological: Negative for adenopathy.   Psychiatric/Behavioral: Negative for hallucinations and suicidal ideas.       I have reviewed and confirmed the accuracy of the ROS as documented by the MA/LPN/RN Harris Shane MD      Objective   /78 (BP Location: Right arm, Patient Position: Sitting, Cuff Size: Adult)   Pulse 75   Temp 98 °F (36.7 °C)   Resp 18   Ht 165.1 cm (65\")   Wt 96 kg (211 lb 9.6 oz)   LMP 12/04/2019   SpO2 98%   BMI 35.21 kg/m²   BP Readings from Last 3 Encounters:   12/21/22 130/78   12/20/21 102/70   07/14/21 114/80     Wt Readings from Last 3 Encounters:   12/21/22 96 kg " (211 lb 9.6 oz)   12/20/21 92.5 kg (204 lb)   07/14/21 86.4 kg (190 lb 6.4 oz)     Physical Exam  Constitutional:       Appearance: She is well-developed. She is not diaphoretic.   HENT:      Head: Normocephalic.      Right Ear: Tympanic membrane, ear canal and external ear normal.      Left Ear: Tympanic membrane, ear canal and external ear normal.      Nose: Nose normal.   Eyes:      General: Lids are normal.      Conjunctiva/sclera: Conjunctivae normal.      Pupils: Pupils are equal, round, and reactive to light.   Neck:      Thyroid: No thyromegaly.      Vascular: No carotid bruit or JVD.      Trachea: No tracheal deviation.   Cardiovascular:      Rate and Rhythm: Normal rate and regular rhythm.      Heart sounds: Normal heart sounds. No murmur heard.    No friction rub. No gallop.   Pulmonary:      Effort: Pulmonary effort is normal.      Breath sounds: Normal breath sounds. No stridor. No decreased breath sounds, wheezing or rales.   Abdominal:      General: Bowel sounds are normal. There is no distension.      Palpations: Abdomen is soft. There is no mass.      Tenderness: There is no abdominal tenderness. There is no guarding or rebound.      Hernia: No hernia is present.   Lymphadenopathy:      Head:      Right side of head: No submental, submandibular, tonsillar, preauricular, posterior auricular or occipital adenopathy.      Left side of head: No submental, submandibular, tonsillar, preauricular, posterior auricular or occipital adenopathy.      Cervical: No cervical adenopathy.   Skin:     General: Skin is warm and dry.      Coloration: Skin is not pale.   Neurological:      Mental Status: She is alert and oriented to person, place, and time.      Cranial Nerves: No cranial nerve deficit.      Sensory: No sensory deficit.      Motor: No tremor, abnormal muscle tone or seizure activity.      Coordination: Coordination normal.      Gait: Gait normal.      Deep Tendon Reflexes: Reflexes are normal and  symmetric.         Data / Lab Results:    Hemoglobin A1C   Date Value Ref Range Status   04/28/2022 5.2 4.0 - 5.6 % Final        No results found for: LDL, LDLDIRECT  No results found for: CHOL  No results found for: TRIG  No results found for: HDL  No results found for: PSA  Lab Results   Component Value Date    WBC 7.6 04/28/2022    HGB 14.3 04/28/2022    HCT 45.7 04/28/2022    MCV 87.5 04/28/2022     04/28/2022     No results found for: TSH, K8TMZXG, D5ZSIRN, THYROIDAB  No results found for: GLUCOSE, BUN, CREATININE, EGFRIFNONA, EGFRIFAFRI, BCR, K, CO2, CALCIUM, PROTENTOTREF, ALBUMIN, LABIL2, BILIRUBIN, AST, ALT  No results found for: THUAN, RF, SEDRATE   No results found for: CRP   No results found for: IRON, TIBC, FERRITIN   No results found for: FGRPCXYZ12     Age-appropriate Screening Schedule:  Refer to the list below for future screening recommendations based on patient's age, sex and/or medical conditions. Orders for these recommended tests are listed in the plan section. The patient has been provided with a written plan.    Health Maintenance   Topic Date Due   • ZOSTER VACCINE (1 of 2) Never done   • INFLUENZA VACCINE  Never done   • MAMMOGRAM  08/25/2023   • TDAP/TD VACCINES (2 - Td or Tdap) 10/28/2023   • LIPID PANEL  12/19/2023   • PAP SMEAR  06/29/2025           Assessment & Plan      Medications        Problem List         LOS  Physical.  Doing well, vaccines current.  Discussed coated baby aspirin daily.  Discussed calcium and vitamin D.  Discussed health maintenance, screening test, and lifestyle modification.  Followed by OB/GYN Dr. Alina Rivera, mammogram current.  Uterine polyp.  Status post D&C 6/29, path benign, followed by OB/GYN.  SVT.  Clinically improved on Toprol currently, infrequent breakthrough episodes.  Avoid caffeine.   Follow-up recheck.  Stress testing/echo benign 2017.  Followed by Dr. Linda, consider cardiology follow-up.  OA knee.  Persistent pain, stiffness.  History  of Meniscus tear, status post repair per Ortho Dr. Oliveira.  Psoriasis.  Stable on topical Rx.  Menopausal syndrome.  Discussed calcium and vitamin D.  Followed by OB/GYN, mammogram current.  Colon cancer screening.  Colonoscopy per [facility] 2019 with polypectomy by 1.  Repeat planned 5 years.  Hyperlipidemia.  Mild elevation .  Add fish oil/red yeast rice.  Discussed diet, exercise lifestyle modification.  Follow-up recheck labs 1 year.  Family history of CVA.  Father, .  Check carotid Dopplers.  GERD.  Doing well on PPI.  Discussed calcium vitamin D.  Longstanding, recommend GI referral/EGD she states she will consider.      Diagnoses and all orders for this visit:    1. Encounter for well adult exam with abnormal findings (Primary)    2. Essential hypertension  -     Discontinue: metoprolol succinate XL (TOPROL-XL) 50 MG 24 hr tablet; Take 1 tablet by mouth Daily.  Dispense: 90 tablet; Refill: 3    3. Hyperlipidemia, unspecified hyperlipidemia type    4. Class 2 severe obesity due to excess calories with serious comorbidity and body mass index (BMI) of 35.0 to 35.9 in adult (HCC)  Assessment & Plan:  Patient's (Body mass index is 35.21 kg/m².) indicates that they are morbidly obese (BMI > 40 or > 35 with obesity - related health condition) with health conditions that include hypertension and dyslipidemias . Weight is improving with treatment. BMI is is above average; BMI management plan is completed. We discussed portion control and increasing exercise.       5. History of tobacco use    6. Colon cancer screening    7. Encounter for screening for malignant neoplasm of breast, unspecified screening modality    8. Malaise and fatigue  -     Cancel: CBC & Differential  -     Cancel: Comprehensive Metabolic Panel  -     Cancel: TSH  -     CBC & Differential; Future  -     Comprehensive Metabolic Panel; Future  -     TSH; Future    9. Screening for lipid disorders  -     Cancel: Lipid Panel With / Chol / HDL  Ratio  -     Lipid Panel With / Chol / HDL Ratio; Future    10. Gastroesophageal reflux disease, unspecified whether esophagitis present  -     Discontinue: omeprazole (priLOSEC) 40 MG capsule; Take 1 capsule by mouth Daily.  Dispense: 90 capsule; Refill: 3    11. Psoriasis  -     fluocinonide (LIDEX) 0.05 % cream; Apply 1 application topically to the appropriate area as directed Every 8 (Eight) Hours.  Dispense: 60 g; Refill: 12    12. Carotid bruit, unspecified laterality  -     US Carotid Bilateral; Future    13. Supraventricular tachycardia (HCC)            Expected course, medications, and adverse effects discussed.  Call or return if worsening or persistent symptoms.  I wore protective equipment throughout this patient encounter including a mask, gloves, and eye protection.  Hand hygiene was performed before donning protective equipment and after removal when leaving the room. The complete contents of the Assessment and Plan and Data / Lab Results as documented above have been reviewed and addressed by myself with the patient today as part of an ongoing evaluation / treatment plan.  If some of the documentation has been copied from a previous note and is unchanged it indicates that this problem / plan has been assessed today but is stable from a previous visit and no changes have been recommended.  The separate E/M service provided today is significant, medically necessary, and separately identifiable.

## 2022-12-21 ENCOUNTER — OFFICE VISIT (OUTPATIENT)
Dept: FAMILY MEDICINE CLINIC | Facility: CLINIC | Age: 57
End: 2022-12-21

## 2022-12-21 ENCOUNTER — TELEPHONE (OUTPATIENT)
Dept: FAMILY MEDICINE CLINIC | Facility: CLINIC | Age: 57
End: 2022-12-21

## 2022-12-21 VITALS
OXYGEN SATURATION: 98 % | HEIGHT: 65 IN | BODY MASS INDEX: 35.25 KG/M2 | WEIGHT: 211.6 LBS | TEMPERATURE: 98 F | DIASTOLIC BLOOD PRESSURE: 78 MMHG | HEART RATE: 75 BPM | RESPIRATION RATE: 18 BRPM | SYSTOLIC BLOOD PRESSURE: 130 MMHG

## 2022-12-21 DIAGNOSIS — Z12.39 ENCOUNTER FOR SCREENING FOR MALIGNANT NEOPLASM OF BREAST, UNSPECIFIED SCREENING MODALITY: ICD-10-CM

## 2022-12-21 DIAGNOSIS — Z12.11 COLON CANCER SCREENING: ICD-10-CM

## 2022-12-21 DIAGNOSIS — Z87.891 HISTORY OF TOBACCO USE: ICD-10-CM

## 2022-12-21 DIAGNOSIS — I47.1 SUPRAVENTRICULAR TACHYCARDIA: ICD-10-CM

## 2022-12-21 DIAGNOSIS — R53.81 MALAISE AND FATIGUE: ICD-10-CM

## 2022-12-21 DIAGNOSIS — L40.9 PSORIASIS: ICD-10-CM

## 2022-12-21 DIAGNOSIS — K21.9 GASTROESOPHAGEAL REFLUX DISEASE, UNSPECIFIED WHETHER ESOPHAGITIS PRESENT: ICD-10-CM

## 2022-12-21 DIAGNOSIS — E78.5 HYPERLIPIDEMIA, UNSPECIFIED HYPERLIPIDEMIA TYPE: ICD-10-CM

## 2022-12-21 DIAGNOSIS — Z13.220 SCREENING FOR LIPID DISORDERS: ICD-10-CM

## 2022-12-21 DIAGNOSIS — I10 ESSENTIAL HYPERTENSION: ICD-10-CM

## 2022-12-21 DIAGNOSIS — E66.01 CLASS 2 SEVERE OBESITY DUE TO EXCESS CALORIES WITH SERIOUS COMORBIDITY AND BODY MASS INDEX (BMI) OF 35.0 TO 35.9 IN ADULT: ICD-10-CM

## 2022-12-21 DIAGNOSIS — R53.83 MALAISE AND FATIGUE: ICD-10-CM

## 2022-12-21 DIAGNOSIS — Z00.01 ENCOUNTER FOR WELL ADULT EXAM WITH ABNORMAL FINDINGS: Primary | ICD-10-CM

## 2022-12-21 DIAGNOSIS — R09.89 CAROTID BRUIT, UNSPECIFIED LATERALITY: ICD-10-CM

## 2022-12-21 DIAGNOSIS — M54.9 ACUTE BILATERAL BACK PAIN, UNSPECIFIED BACK LOCATION: Primary | ICD-10-CM

## 2022-12-21 PROBLEM — E66.812 CLASS 2 SEVERE OBESITY DUE TO EXCESS CALORIES WITH SERIOUS COMORBIDITY AND BODY MASS INDEX (BMI) OF 35.0 TO 35.9 IN ADULT: Status: ACTIVE | Noted: 2019-12-18

## 2022-12-21 PROCEDURE — 99396 PREV VISIT EST AGE 40-64: CPT | Performed by: FAMILY MEDICINE

## 2022-12-21 PROCEDURE — 99213 OFFICE O/P EST LOW 20 MIN: CPT | Performed by: FAMILY MEDICINE

## 2022-12-21 RX ORDER — METOPROLOL SUCCINATE 50 MG/1
50 TABLET, EXTENDED RELEASE ORAL DAILY
Qty: 90 TABLET | Refills: 3 | Status: SHIPPED | OUTPATIENT
Start: 2022-12-21 | End: 2022-12-23

## 2022-12-21 RX ORDER — OMEPRAZOLE 40 MG/1
40 CAPSULE, DELAYED RELEASE ORAL DAILY
Qty: 90 CAPSULE | Refills: 3 | Status: SHIPPED | OUTPATIENT
Start: 2022-12-21 | End: 2022-12-23

## 2022-12-21 RX ORDER — PREDNISONE 1 MG/1
TABLET ORAL
Qty: 45 TABLET | Refills: 0 | Status: SHIPPED | OUTPATIENT
Start: 2022-12-21

## 2022-12-21 NOTE — ASSESSMENT & PLAN NOTE
Patient's (Body mass index is 35.21 kg/m².) indicates that they are morbidly obese (BMI > 40 or > 35 with obesity - related health condition) with health conditions that include hypertension and dyslipidemias . Weight is improving with treatment. BMI is is above average; BMI management plan is completed. We discussed portion control and increasing exercise.

## 2022-12-21 NOTE — TELEPHONE ENCOUNTER
Caller: Corinna Hooks    Relationship: Self    Best call back number: [phone]    What medication are you requesting: PREDNISONE    What are your current symptoms: CRAMP IN HER BACK    If a prescription is needed, what is your preferred pharmacy and phone number:    [id number] - [place], [place] - [address] - [phone]  - [phone] FX      Additional notes:

## 2022-12-22 DIAGNOSIS — I10 ESSENTIAL HYPERTENSION: ICD-10-CM

## 2022-12-22 DIAGNOSIS — K21.9 GASTROESOPHAGEAL REFLUX DISEASE, UNSPECIFIED WHETHER ESOPHAGITIS PRESENT: ICD-10-CM

## 2022-12-23 RX ORDER — OMEPRAZOLE 40 MG/1
CAPSULE, DELAYED RELEASE ORAL
Qty: 90 CAPSULE | Refills: 3 | Status: SHIPPED | OUTPATIENT
Start: 2022-12-23

## 2022-12-23 RX ORDER — METOPROLOL SUCCINATE 50 MG/1
TABLET, EXTENDED RELEASE ORAL
Qty: 90 TABLET | Refills: 3 | Status: SHIPPED | OUTPATIENT
Start: 2022-12-23

## 2023-01-06 ENCOUNTER — APPOINTMENT (OUTPATIENT)
Dept: ULTRASOUND IMAGING | Facility: HOSPITAL | Age: 58
End: 2023-01-06

## 2023-05-11 DIAGNOSIS — I10 ESSENTIAL HYPERTENSION: ICD-10-CM

## 2023-05-11 NOTE — TELEPHONE ENCOUNTER
Caller: Corinna Hooks    Relationship: Self    Best call back number: [phone]    Requested Prescriptions:   Requested Prescriptions     Pending Prescriptions Disp Refills   • metoprolol succinate XL (TOPROL-XL) 50 MG 24 hr tablet 90 tablet 3     Sig: Take 1 tablet by mouth Daily.        Pharmacy where request should be sent: [id number] - [place], [place] [address] - [phone]  - [phone] FX     Last office visit with prescribing clinician: 12/21/2022   Last telemedicine visit with prescribing clinician: 12/22/2022   Next office visit with prescribing clinician: 12/27/2023     Additional details provided by patient: PATIENT IS WAITING ON REFILL FROM PHARMACY BUT IT WILL NOT BE DELIVERED UNTIL 5/14 SO SHE WANTS TO KNOW IF AN EMERGENCY SUPPLY CAN BE SENT    Does the patient have less than a 3 day supply:  [x] Yes  [] No    Nuno Huerta Rep   05/11/23 14:48 EDT

## 2023-05-12 RX ORDER — METOPROLOL SUCCINATE 50 MG/1
50 TABLET, EXTENDED RELEASE ORAL DAILY
Qty: 90 TABLET | Refills: 3 | Status: SHIPPED | OUTPATIENT
Start: 2023-05-12

## 2023-05-12 NOTE — TELEPHONE ENCOUNTER
Caller: Corinna Hooks    Relationship: Self    Best call back number: [phone]     Requested Prescriptions:   Requested Prescriptions     Pending Prescriptions Disp Refills   • metoprolol succinate XL (TOPROL-XL) 50 MG 24 hr tablet 90 tablet 3     Sig: Take 1 tablet by mouth Daily.        Pharmacy where request should be sent: [id number] - [place], [place] - [address] - [phone]  - [phone] FX     Last office visit with prescribing clinician: 12/21/2022   Last telemedicine visit with prescribing clinician: 12/22/2022   Next office visit with prescribing clinician: 12/27/2023     Additional details provided by patient: PATIENT WILL BE COMPLETLEY OUT TOMORROW  SHE IS HOPING WE CAN SUBMIT THIS TODAY PLEASE     Does the patient have less than a 3 day supply:  [x] Yes  [] No    Would you like a call back once the refill request has been completed: [] Yes [x] No    If the office needs to give you a call back, can they leave a voicemail: [x] Yes [] No    BARRIE Munoz   05/12/23 13:54 EDT

## 2023-11-27 ENCOUNTER — TELEPHONE (OUTPATIENT)
Dept: FAMILY MEDICINE CLINIC | Facility: CLINIC | Age: 58
End: 2023-11-27

## 2023-11-27 NOTE — TELEPHONE ENCOUNTER
Caller: Corinna Hooks    Relationship: Self    Best call back number:     What orders are you requesting (i.e. lab or imaging): ROUTINE LABS/GLUCOSE    In what timeframe would the patient need to come in:     Where will you receive your lab/imaging services:     Additional notes: PATIENT STATES THAT SHE NEEDS ORDERS TO GO IN TO HAVE HER LABS DONE.  ONCE THE ORDERS ARE COMPLETED SHE WANTS THE ORDER SENT TO [facility] IN [place] AS THIS IS WHERE SHE GOES TO HAVE HER LABS DRAWN.  SHE WANTS TO BE CONTACTED ONCE THE ORDERS ARE COMPLETED.

## 2023-11-28 DIAGNOSIS — Z13.220 SCREENING FOR LIPID DISORDERS: ICD-10-CM

## 2023-11-28 DIAGNOSIS — Z00.01 ENCOUNTER FOR WELL ADULT EXAM WITH ABNORMAL FINDINGS: ICD-10-CM

## 2023-11-28 DIAGNOSIS — I10 ESSENTIAL HYPERTENSION: Primary | ICD-10-CM

## 2023-12-27 ENCOUNTER — OFFICE VISIT (OUTPATIENT)
Dept: FAMILY MEDICINE CLINIC | Facility: CLINIC | Age: 58
End: 2023-12-27
Payer: COMMERCIAL

## 2023-12-27 VITALS
BODY MASS INDEX: 36.06 KG/M2 | HEIGHT: 65 IN | DIASTOLIC BLOOD PRESSURE: 78 MMHG | SYSTOLIC BLOOD PRESSURE: 130 MMHG | WEIGHT: 216.4 LBS | RESPIRATION RATE: 18 BRPM | OXYGEN SATURATION: 96 % | HEART RATE: 83 BPM | TEMPERATURE: 98.2 F

## 2023-12-27 DIAGNOSIS — R05.1 ACUTE COUGH: ICD-10-CM

## 2023-12-27 DIAGNOSIS — R53.83 MALAISE AND FATIGUE: ICD-10-CM

## 2023-12-27 DIAGNOSIS — R13.19 OTHER DYSPHAGIA: ICD-10-CM

## 2023-12-27 DIAGNOSIS — R53.81 MALAISE AND FATIGUE: ICD-10-CM

## 2023-12-27 DIAGNOSIS — K21.9 GASTROESOPHAGEAL REFLUX DISEASE, UNSPECIFIED WHETHER ESOPHAGITIS PRESENT: ICD-10-CM

## 2023-12-27 DIAGNOSIS — Z12.31 ENCOUNTER FOR SCREENING MAMMOGRAM FOR MALIGNANT NEOPLASM OF BREAST: ICD-10-CM

## 2023-12-27 DIAGNOSIS — E78.5 HYPERLIPIDEMIA, UNSPECIFIED HYPERLIPIDEMIA TYPE: ICD-10-CM

## 2023-12-27 DIAGNOSIS — Z87.891 HISTORY OF TOBACCO USE: ICD-10-CM

## 2023-12-27 DIAGNOSIS — Z00.01 ENCOUNTER FOR WELL ADULT EXAM WITH ABNORMAL FINDINGS: Primary | ICD-10-CM

## 2023-12-27 DIAGNOSIS — E66.01 CLASS 2 SEVERE OBESITY DUE TO EXCESS CALORIES WITH SERIOUS COMORBIDITY AND BODY MASS INDEX (BMI) OF 35.0 TO 35.9 IN ADULT: ICD-10-CM

## 2023-12-27 DIAGNOSIS — Z12.11 SCREENING FOR MALIGNANT NEOPLASM OF COLON: ICD-10-CM

## 2023-12-27 DIAGNOSIS — L40.9 PSORIASIS: ICD-10-CM

## 2023-12-27 DIAGNOSIS — I10 ESSENTIAL HYPERTENSION: ICD-10-CM

## 2023-12-27 LAB
BILIRUB BLD-MCNC: NEGATIVE MG/DL
CLARITY, POC: CLEAR
COLOR UR: YELLOW
GLUCOSE UR STRIP-MCNC: NEGATIVE MG/DL
KETONES UR QL: NEGATIVE
LEUKOCYTE EST, POC: NEGATIVE
NITRITE UR-MCNC: NEGATIVE MG/ML
PH UR: 5 [PH] (ref 5–8)
PROT UR STRIP-MCNC: ABNORMAL MG/DL
RBC # UR STRIP: NEGATIVE /UL
SP GR UR: 1.01 (ref 1–1.03)
UROBILINOGEN UR QL: NORMAL

## 2023-12-27 RX ORDER — METOPROLOL SUCCINATE 50 MG/1
50 TABLET, EXTENDED RELEASE ORAL DAILY
Qty: 90 TABLET | Refills: 3 | Status: SHIPPED | OUTPATIENT
Start: 2023-12-27

## 2023-12-27 RX ORDER — CEPHALEXIN 500 MG/1
500 CAPSULE ORAL 3 TIMES DAILY
Qty: 30 CAPSULE | Refills: 0 | Status: SHIPPED | OUTPATIENT
Start: 2023-12-27

## 2023-12-27 RX ORDER — OMEPRAZOLE 40 MG/1
40 CAPSULE, DELAYED RELEASE ORAL DAILY
Qty: 90 CAPSULE | Refills: 3 | Status: SHIPPED | OUTPATIENT
Start: 2023-12-27

## 2023-12-27 NOTE — PROGRESS NOTES
Subjective   Corinna Hooks is a 58 y.o. female.     Chief Complaint   Patient presents with    Annual Exam    Hypertension    Hyperlipidemia       The patient is here: to discuss health maintenance and disease prevention.  Last comprehensive physical was on 12/21/2022.  Previous physical was performed by  Harris Shane MD  Overall has: moderate activity with work/home activities, good appetite, feels well with minor complaints, decreased energy level, and is sleeping well. Nutrition: balanced diet. Last tetanus shot was  10/28/2013 . Patient is doing routine self breast exams: occasionally     Last Completed Mammogram            MAMMOGRAM (Every 2 Years) Next due on 10/20/2025      10/20/2023  SCANNED - MAMMO    09/02/2022  SCANNED - MAMMO    08/25/2021  SCANNED - MAMMO    08/24/2021  Outside Procedure:  MAMMOGRAM SCREENING BILAT DIGITAL W CAD    11/06/2013   Mammogram component of  MAMMOGRAPHY    Only the first 5 history entries have been loaded, but more history exists.                   Last Completed Colonoscopy            COLORECTAL CANCER SCREENING (COLONOSCOPY - Every 10 Years) Next due on 4/4/2029 04/04/2019   Colonoscopy component of  COLONOSCOPY    04/04/2019  SCANNED - COLONOSCOPY                    Hypertension  This is a chronic problem. The current episode started more than 1 year ago. The problem is controlled. Associated symptoms include palpitations. Pertinent negatives include no chest pain or shortness of breath. Risk factors for coronary artery disease include obesity. Past treatments include nothing. Current antihypertension treatment includes beta blockers. There are no compliance problems.    Hyperlipidemia  This is a chronic problem. The current episode started more than 1 year ago. Recent lipid tests were reviewed and are high. Factors aggravating her hyperlipidemia include fatty foods. Pertinent negatives include no chest pain or shortness of breath. Current  antihyperlipidemic treatment includes diet change and exercise. Risk factors for coronary artery disease include hypertension and obesity.        Recent Hospitalizations:  No hospitalization(s) within the last year..  ccc      I personally reviewed and updated the patient's allergies, medications, problem list, and past medical, surgical, social, and family history. I have reviewed and confirmed the accuracy of the HPI and ROS as documented by the MA/LPN/RN Harris Shane MD    Family History   Problem Relation Age of Onset    Heart disease Mother     Diabetes Mother     Hyperlipidemia Mother     Tremor Father     Stroke Father     Heart disease Brother     Cancer Maternal Aunt         Pancreatic       Social History     Tobacco Use    Smoking status: Former     Packs/day: 0.50     Years: 2.00     Additional pack years: 0.00     Total pack years: 1.00     Types: Cigarettes     Start date: 1987     Quit date: 1989     Years since quittin.0    Smokeless tobacco: Never    Tobacco comments:     quit in    Vaping Use    Vaping Use: Never used   Substance Use Topics    Alcohol use: Yes     Comment: Few glasses of wine about every 6 weeks    Drug use: Never       Past Surgical History:   Procedure Laterality Date    CARPAL TUNNEL RELEASE Right 2022    [place]    CERVICAL CONIZATION      Comments: CARLOS III    CHOLECYSTECTOMY      COLONOSCOPY  2019    D & C WITH SUCTION  2021    DILATATION AND CURETTAGE      Of the Uterus.    ENDOSCOPY  2023    JOINT REPLACEMENT  May 2022    Left TKR    KNEE SURGERY Left     SPINE SURGERY      Lumbar reduction    TUBAL ABDOMINAL LIGATION Bilateral 2008       Patient Active Problem List   Diagnosis    Acute crisis reaction    Allergic rhinitis    Candidiasis of vulva and vagina    Colon cancer screening    Gastroesophageal reflux disease    Hyperlipidemia    Irritable bowel syndrome    Class 2 severe obesity due to excess calories with  serious comorbidity and body mass index (BMI) of 35.0 to 35.9 in adult    Psoriasis    Supraventricular tachycardia    Tinnitus    Essential hypertension    History of tobacco use    Disorder of lacrimal system    Encounter for well adult exam with abnormal findings    Screening for malignant neoplasm of breast    Other dysphagia         Current Outpatient Medications:     aspirin 81 MG chewable tablet, , Disp: , Rfl:     fluocinonide (LIDEX) 0.05 % cream, Apply 1 application  topically to the appropriate area as directed Every 8 (Eight) Hours., Disp: 30 g, Rfl: 12    metoprolol succinate XL (TOPROL-XL) 50 MG 24 hr tablet, Take 1 tablet by mouth Daily., Disp: 90 tablet, Rfl: 3    omeprazole (priLOSEC) 40 MG capsule, Take 1 capsule by mouth Daily., Disp: 90 capsule, Rfl: 3    cephalexin (KEFLEX) 500 MG capsule, Take 1 capsule by mouth 3 (Three) Times a Day., Disp: 30 capsule, Rfl: 0    Red Yeast Rice Extract (RED YEAST RICE PO), Take 2,400 mg by mouth Daily. Dr Shane recommended 2400mg daily 12/27/23, Disp: , Rfl:     Review of Systems   Constitutional:  Negative for chills and diaphoresis.   HENT:  Negative for trouble swallowing and voice change.    Eyes:  Negative for visual disturbance.   Respiratory:  Negative for shortness of breath.    Cardiovascular:  Positive for palpitations. Negative for chest pain.   Gastrointestinal:  Negative for abdominal pain and nausea.   Endocrine: Negative for polydipsia and polyphagia.   Genitourinary:  Negative for hematuria.   Musculoskeletal:  Negative for neck stiffness.   Skin:  Negative for color change and pallor.   Allergic/Immunologic: Negative for immunocompromised state.   Neurological:  Negative for seizures and syncope.   Hematological:  Negative for adenopathy.   Psychiatric/Behavioral:  Negative for hallucinations, sleep disturbance and suicidal ideas.        I have reviewed and confirmed the accuracy of the ROS as documented by the MA/LPN/RN Harris Shane,  "MD      Objective   /78 (BP Location: Right arm, Patient Position: Sitting, Cuff Size: Large Adult)   Pulse 83   Temp 98.2 °F (36.8 °C) (Temporal)   Resp 18   Ht 163.8 cm (64.5\")   Wt 98.2 kg (216 lb 6.4 oz)   LMP 12/04/2019   SpO2 96%   BMI 36.57 kg/m²   BP Readings from Last 3 Encounters:   12/27/23 130/78   12/21/22 130/78   12/20/21 102/70     Wt Readings from Last 3 Encounters:   12/27/23 98.2 kg (216 lb 6.4 oz)   12/21/22 96 kg (211 lb 9.6 oz)   12/20/21 92.5 kg (204 lb)     Physical Exam  Constitutional:       Appearance: She is well-developed. She is not diaphoretic.   HENT:      Head: Normocephalic.      Right Ear: Hearing, tympanic membrane, ear canal and external ear normal.      Left Ear: Hearing, tympanic membrane, ear canal and external ear normal.      Nose: Nose normal. No mucosal edema or congestion.      Right Sinus: No maxillary sinus tenderness or frontal sinus tenderness.      Left Sinus: No maxillary sinus tenderness or frontal sinus tenderness.      Mouth/Throat:      Mouth: No oral lesions.      Pharynx: Uvula midline. No oropharyngeal exudate or posterior oropharyngeal erythema.      Tonsils: No tonsillar exudate.   Eyes:      General: Lids are normal.      Conjunctiva/sclera: Conjunctivae normal.      Pupils: Pupils are equal, round, and reactive to light.   Neck:      Thyroid: No thyromegaly.      Vascular: No carotid bruit or JVD.      Trachea: No tracheal deviation.   Cardiovascular:      Rate and Rhythm: Normal rate and regular rhythm.      Heart sounds: Normal heart sounds. No murmur heard.     No friction rub. No gallop.   Pulmonary:      Effort: Pulmonary effort is normal.      Breath sounds: Normal breath sounds. No stridor. No decreased breath sounds, wheezing or rales.   Abdominal:      General: Bowel sounds are normal. There is no distension.      Palpations: Abdomen is soft. There is no mass.      Tenderness: There is no abdominal tenderness. There is no guarding " "or rebound.      Hernia: No hernia is present.   Lymphadenopathy:      Head:      Right side of head: No submental, submandibular, tonsillar, preauricular, posterior auricular or occipital adenopathy.      Left side of head: No submental, submandibular, tonsillar, preauricular, posterior auricular or occipital adenopathy.      Cervical: No cervical adenopathy.   Skin:     General: Skin is warm and dry.      Coloration: Skin is not pale.   Neurological:      Mental Status: She is alert and oriented to person, place, and time.      Cranial Nerves: No cranial nerve deficit.      Sensory: No sensory deficit.      Coordination: Coordination normal.      Gait: Gait normal.      Deep Tendon Reflexes: Reflexes are normal and symmetric.         Data / Lab Results:    Hemoglobin A1C   Date Value Ref Range Status   04/28/2022 5.2 4.0 - 5.6 % Final        Lab Results   Component Value Date     (H) 12/19/2023     No results found for: \"CHOL\"  Lab Results   Component Value Date    TRIG 68 12/19/2023     Lab Results   Component Value Date    HDL 68 12/19/2023     No results found for: \"PSA\"  Lab Results   Component Value Date    WBC 6.5 12/19/2023    HGB 13.9 12/19/2023    HCT 44.5 12/19/2023    MCV 88.5 12/19/2023     12/19/2023     No results found for: \"TSH\", \"Q0RTHPB\", \"U6YBVSY\", \"THYROIDAB\"  No results found for: \"GLUCOSE\", \"BUN\", \"CREATININE\", \"EGFRIFNONA\", \"EGFRIFAFRI\", \"BCR\", \"K\", \"CO2\", \"CALCIUM\", \"PROTENTOTREF\", \"ALBUMIN\", \"LABIL2\", \"BILIRUBIN\", \"AST\", \"ALT\"  No results found for: \"THUAN\", \"RF\", \"SEDRATE\"   No results found for: \"CRP\"   No results found for: \"IRON\", \"TIBC\", \"FERRITIN\"   No results found for: \"LWXZGTRS47\"     Age-appropriate Screening Schedule:  Refer to the list below for future screening recommendations based on patient's age, sex and/or medical conditions. Orders for these recommended tests are listed in the plan section. The patient has been provided with a written plan.    Health " Maintenance   Topic Date Due    ZOSTER VACCINE (1 of 2) Never done    HEPATITIS C SCREENING  Never done    INFLUENZA VACCINE  Never done    COVID-19 Vaccine (3 - -24 season) 2023    TDAP/TD VACCINES (2 - Td or Tdap) 10/28/2023    LIPID PANEL  2024    ANNUAL PHYSICAL  2024    BMI FOLLOWUP  2024    PAP SMEAR  2025    MAMMOGRAM  10/20/2025    COLORECTAL CANCER SCREENING  2029    Pneumococcal Vaccine 0-64  Aged Out           Assessment & Plan      Medications        Problem List         LOS  Physical.  Doing well, vaccines current.  Discussed coated baby aspirin daily.  Discussed calcium and vitamin D.  Discussed health maintenance, screening test, and lifestyle modification.  Followed by OB/GYN Dr. Alina Rivera, mammogram current.  Uterine polyp.  Status post D&C , path benign, followed by OB/GYN.  SVT.  Clinically improved on Toprol currently, infrequent breakthrough episodes.  Avoid caffeine.   Follow-up recheck.  Stress testing/echo benign .  Followed by Dr. Linda, consider cardiology follow-up.  OA knee.  Persistent pain, stiffness.  History of Meniscus tear, status post repair per Ortho Dr. Oliveira.  Psoriasis.  Stable on topical Rx.  Menopausal syndrome.  Discussed calcium and vitamin D.  Followed by OB/GYN, mammogram current.  Colon cancer screening.  Colonoscopy per [facility]  with polypectomy by 1.  Repeat planned 5 years.  Hyperlipidemia.  Worse, mild - mod  elevation LDL to 149.  Has not been working on diet, restart.  tolerating fish oil/red yeast rice/ dose increased.  Discussed diet, exercise lifestyle modification.  Follow-up recheck labs 1 year.  Plan add statin if if persistent elevaiton  Family history of CVA.  Father, .  carotid Dopplers normal , continue risk factor reduction.  GERD. With dysphagia, improved today s/p EGD with dilation, back on ppi, recommmend continue indefinately, with barrents esophagus per her report will get records has  gastroenterology follow up upcoming.  Consider further eval if if persistent symptoms Follow up recheck.   Upper respiratory infection.  With serous otitis.  Increase fluid intake, add antihistamine.  Start antibiotics in 3 to 4 days if symptoms persist.    Diagnoses and all orders for this visit:    1. Encounter for well adult exam with abnormal findings (Primary)  -     POCT urinalysis dipstick, manual  -     Cancel: CBC & Differential  -     Cancel: Comprehensive Metabolic Panel  -     Cancel: Lipid Panel With / Chol / HDL Ratio  -     Cancel: TSH  -     CBC & Differential; Future  -     Comprehensive Metabolic Panel; Future  -     Lipid Panel With / Chol / HDL Ratio; Future  -     TSH; Future    2. Hyperlipidemia, unspecified hyperlipidemia type  -     Cancel: CBC & Differential  -     Cancel: Comprehensive Metabolic Panel  -     Cancel: Lipid Panel With / Chol / HDL Ratio  -     Cancel: TSH  -     CBC & Differential; Future  -     Comprehensive Metabolic Panel; Future  -     Lipid Panel With / Chol / HDL Ratio; Future  -     TSH; Future    3. Essential hypertension  -     metoprolol succinate XL (TOPROL-XL) 50 MG 24 hr tablet; Take 1 tablet by mouth Daily.  Dispense: 90 tablet; Refill: 3  -     Cancel: CBC & Differential  -     Cancel: Comprehensive Metabolic Panel  -     Cancel: Lipid Panel With / Chol / HDL Ratio  -     Cancel: TSH  -     CBC & Differential; Future  -     Comprehensive Metabolic Panel; Future  -     Lipid Panel With / Chol / HDL Ratio; Future  -     TSH; Future    4. Class 2 severe obesity due to excess calories with serious comorbidity and body mass index (BMI) of 35.0 to 35.9 in adult    5. History of tobacco use    6. Encounter for screening mammogram for malignant neoplasm of breast    7. Screening for malignant neoplasm of colon    8. Psoriasis  -     Discontinue: fluocinonide (LIDEX) 0.05 % cream; Apply 1 application  topically to the appropriate area as directed Every 8 (Eight) Hours.   Dispense: 30 g; Refill: 12  -     fluocinonide (LIDEX) 0.05 % cream; Apply 1 application  topically to the appropriate area as directed Every 8 (Eight) Hours.  Dispense: 30 g; Refill: 12    9. Gastroesophageal reflux disease, unspecified whether esophagitis present  -     omeprazole (priLOSEC) 40 MG capsule; Take 1 capsule by mouth Daily.  Dispense: 90 capsule; Refill: 3    10. Malaise and fatigue  -     Cancel: CBC & Differential  -     Cancel: Comprehensive Metabolic Panel  -     Cancel: Lipid Panel With / Chol / HDL Ratio  -     Cancel: TSH  -     CBC & Differential; Future  -     Comprehensive Metabolic Panel; Future  -     Lipid Panel With / Chol / HDL Ratio; Future  -     TSH; Future    11. Acute cough  -     cephalexin (KEFLEX) 500 MG capsule; Take 1 capsule by mouth 3 (Three) Times a Day.  Dispense: 30 capsule; Refill: 0    12. Other dysphagia        Class 2 Severe Obesity (BMI >=35 and <=39.9). Obesity-related health conditions include the following: hypertension and dyslipidemias. Obesity is unchanged. BMI is is above average; BMI management plan is completed. We discussed portion control and increasing exercise.        Expected course, medications, and adverse effects discussed.  Call or return if worsening or persistent symptoms.  I wore protective equipment throughout this patient encounter including a mask, gloves, and eye protection.  Hand hygiene was performed before donning protective equipment and after removal when leaving the room. The complete contents of the Assessment and Plan and Data / Lab Results as documented above have been reviewed and addressed by myself with the patient today as part of an ongoing evaluation / treatment plan.  If some of the documentation has been copied from a previous note and is unchanged it indicates that this problem / plan has been assessed today but is stable from a previous visit and no changes have been recommended.  The separate E/M service provided today is  significant, medically necessary, and separately identifiable.

## 2023-12-29 PROBLEM — R13.19 OTHER DYSPHAGIA: Status: ACTIVE | Noted: 2023-12-29

## 2024-01-02 ENCOUNTER — TELEPHONE (OUTPATIENT)
Dept: FAMILY MEDICINE CLINIC | Facility: CLINIC | Age: 59
End: 2024-01-02
Payer: COMMERCIAL

## 2024-01-02 DIAGNOSIS — H92.09 EARACHE: Primary | ICD-10-CM

## 2024-01-02 NOTE — TELEPHONE ENCOUNTER
Sent in medication. Attempted to call patient with no answer. Left VM with detail message per verbal.

## 2024-01-02 NOTE — TELEPHONE ENCOUNTER
Caller: Corinna Hooks    Relationship: Self    Best call back number: [phone]    What medication are you requesting: EAR DROPS    What are your current symptoms: EARS FEEL FULL    How long have you been experiencing symptoms: 4 DAYS    Have you had these symptoms before:    [] Yes  [x] No    Have you been treated for these symptoms before:   [] Yes  [x] No    If a prescription is needed, what is your preferred pharmacy and phone number:  [facility] [address]  [phone]    Additional notes: PATIENT IS CALLING IN STATING THAT SHE FEELS LIKE HER EARS ARE FULL, WITH A LITTLE PAIN.  SHE WANTS TO KNOW IF DR JOYCE WILL CALL HER IN MEDICTION(EAR DROPS) TO HELP.  SHE SAYS SHE WAS PRESCRIBED A ANTIBIOTIC BY DR JOYCE BUT WOULD PREFER TO HAVE EAR DROPS.

## 2024-01-02 NOTE — TELEPHONE ENCOUNTER
Patient was seen in office on 12/27/2023 for physical, and was prescribed Keflex 500 3x daily for 10 days. Patient is asking for ear drops.

## 2024-02-01 ENCOUNTER — TELEPHONE (OUTPATIENT)
Dept: FAMILY MEDICINE CLINIC | Facility: CLINIC | Age: 59
End: 2024-02-01
Payer: COMMERCIAL

## 2024-02-02 NOTE — TELEPHONE ENCOUNTER
Called and left a detailed message that best to be evaluated. Requeted that patient call back to scheduled appointment

## 2024-04-15 NOTE — PROGRESS NOTES
Subjective   Corinna Hooks is a 59 y.o. female.     Chief Complaint   Patient presents with    Shoulder Pain    Hypertension    Hyperlipidemia       History of Present Illness  Corinna presents to the office today for right shoulder pain. She reports that this has been ongoing and she has been seeing a chiropractor who reports inflammation. She denies any problems with raising her arm over her head and reports that it actually relieves the tension.   Hypertension  This is a chronic problem. The current episode started more than 1 year ago. The problem is controlled. Associated symptoms include palpitations. Pertinent negatives include no chest pain or shortness of breath. Risk factors for coronary artery disease include obesity. Past treatments include nothing. Current antihypertension treatment includes beta blockers. There are no compliance problems.    Hyperlipidemia  This is a chronic problem. The current episode started more than 1 year ago. Recent lipid tests were reviewed and are high. Factors aggravating her hyperlipidemia include fatty foods. Pertinent negatives include no chest pain or shortness of breath. Current antihyperlipidemic treatment includes diet change and exercise. Risk factors for coronary artery disease include hypertension and obesity.            I personally reviewed and updated the patient's allergies, medications, problem list, and past medical, surgical, social, and family history. I have reviewed and confirmed the accuracy of the History of Present Illness and Review of Symptoms as documented by the MA/LPN/RN. Harris Shane MD    Family History   Problem Relation Age of Onset    Heart disease Mother     Diabetes Mother     Hyperlipidemia Mother     Tremor Father     Stroke Father     Heart disease Brother     Cancer Maternal Aunt         Pancreatic       Social History     Tobacco Use    Smoking status: Former     Current packs/day: 0.00     Average packs/day: 0.5 packs/day for 2.0  years (1.0 ttl pk-yrs)     Types: Cigarettes     Start date: 1987     Quit date: 1989     Years since quittin.3    Smokeless tobacco: Never    Tobacco comments:     quit in    Vaping Use    Vaping status: Never Used   Substance Use Topics    Alcohol use: Yes     Comment: Few glasses of wine about every 6 weeks    Drug use: Never       Past Surgical History:   Procedure Laterality Date    CARPAL TUNNEL RELEASE Right 2022    [facility]    CERVICAL CONIZATION      Comments: CARLOS III    CHOLECYSTECTOMY      COLONOSCOPY      D & C WITH SUCTION  2021    DILATATION AND CURETTAGE      Of the Uterus.    ENDOSCOPY  2023    JOINT REPLACEMENT  May 2022    Left TKR    KNEE SURGERY Left     SPINE SURGERY      Lumbar reduction    TUBAL ABDOMINAL LIGATION Bilateral        Patient Active Problem List   Diagnosis    Acute crisis reaction    Allergic rhinitis    Candidiasis of vulva and vagina    Colon cancer screening    Gastroesophageal reflux disease    Hyperlipidemia    Irritable bowel syndrome    Class 2 severe obesity due to excess calories with serious comorbidity and body mass index (BMI) of 35.0 to 35.9 in adult    Psoriasis    Supraventricular tachycardia    Tinnitus    Essential hypertension    History of tobacco use    Disorder of lacrimal system    Encounter for well adult exam with abnormal findings    Screening for malignant neoplasm of breast    Other dysphagia    Lipoma of torso         Current Outpatient Medications:     aspirin 81 MG chewable tablet, , Disp: , Rfl:     fluocinonide (LIDEX) 0.05 % cream, Apply 1 application  topically to the appropriate area as directed Every 8 (Eight) Hours., Disp: 30 g, Rfl: 12    metoprolol succinate XL (TOPROL-XL) 50 MG 24 hr tablet, Take 1 tablet by mouth Daily., Disp: 90 tablet, Rfl: 3    omeprazole (priLOSEC) 40 MG capsule, Take 1 capsule by mouth Daily., Disp: 90 capsule, Rfl: 3    Red Yeast Rice Extract (RED YEAST  "RICE PO), Take 2,400 mg by mouth Daily. Dr Shane recommended 2400mg daily 12/27/23, Disp: , Rfl:     predniSONE (DELTASONE) 5 MG tablet, 40mg x 3 days, 20mg x 3 days, 10mg x 3 days, 5mg x 3 days, Disp: 45 tablet, Rfl: 0    Semaglutide (OZEMPIC, 0.25 OR 0.5 MG/DOSE, SC), Inject 0.25 mg under the skin into the appropriate area as directed 1 (One) Time Per Week., Disp: , Rfl:          Review of Systems   Constitutional:  Negative for chills and diaphoresis.   Eyes:  Negative for visual disturbance.   Respiratory:  Negative for shortness of breath.    Cardiovascular:  Positive for palpitations. Negative for chest pain.   Gastrointestinal:  Negative for abdominal pain and nausea.   Endocrine: Negative for polydipsia and polyphagia.   Musculoskeletal:  Negative for neck stiffness.   Skin:  Negative for color change and pallor.   Neurological:  Negative for seizures and syncope.   Hematological:  Negative for adenopathy.       I have reviewed and confirmed the accuracy of the ROS as documented by the MA/LPN/RN Harris Shane MD      Objective   /80 (BP Location: Right arm, Patient Position: Sitting, Cuff Size: Adult)   Pulse 74   Temp 98.1 °F (36.7 °C)   Resp 18   Ht 163.8 cm (64.5\")   Wt 95.7 kg (211 lb)   LMP 12/04/2019   SpO2 98%   BMI 35.66 kg/m²   BP Readings from Last 3 Encounters:   04/16/24 118/80   12/27/23 130/78   12/21/22 130/78     Wt Readings from Last 3 Encounters:   04/16/24 95.7 kg (211 lb)   12/27/23 98.2 kg (216 lb 6.4 oz)   12/21/22 96 kg (211 lb 9.6 oz)     Physical Exam  Constitutional:       Appearance: Normal appearance. She is well-developed. She is not diaphoretic.   Cardiovascular:      Rate and Rhythm: Normal rate and regular rhythm.      Pulses: Normal pulses.      Heart sounds: Normal heart sounds, S1 normal and S2 normal. No murmur heard.     No friction rub. No gallop.   Pulmonary:      Effort: Pulmonary effort is normal. No accessory muscle usage.      Breath sounds: Normal " "breath sounds. No stridor. No decreased breath sounds, wheezing, rhonchi or rales.   Abdominal:      General: Bowel sounds are normal. There is no distension.      Palpations: Abdomen is soft. Abdomen is not rigid. There is no mass or pulsatile mass.      Tenderness: There is no abdominal tenderness. There is no guarding or rebound. Negative signs include Allen's sign.      Hernia: No hernia is present.   Musculoskeletal:      Right shoulder: Normal. No swelling, deformity, effusion or crepitus. Normal range of motion. Normal strength.      Left shoulder: Normal. No swelling, deformity, effusion, tenderness or crepitus. Normal range of motion. Normal strength.      Cervical back: No swelling, deformity, spasms or tenderness.   Skin:     General: Skin is warm and dry.      Coloration: Skin is not pale.      Comments: Small lipoma flank, benign appearance   Neurological:      Mental Status: She is alert and oriented to person, place, and time.      Coordination: Coordination normal.      Gait: Gait normal.         Data / Lab Results:    Hemoglobin A1C   Date Value Ref Range Status   04/28/2022 5.2 4.0 - 5.6 % Final        Lab Results   Component Value Date     (H) 12/19/2023     No results found for: \"CHOL\"  Lab Results   Component Value Date    TRIG 68 12/19/2023     Lab Results   Component Value Date    HDL 68 12/19/2023     No results found for: \"PSA\"  Lab Results   Component Value Date    WBC 6.5 12/19/2023    HGB 13.9 12/19/2023    HCT 44.5 12/19/2023    MCV 88.5 12/19/2023     12/19/2023     No results found for: \"TSH\", \"R7SXXXF\", \"O6RLTIZ\", \"THYROIDAB\"   No results found for: \"GLUCOSE\", \"BUN\", \"CREATININE\", \"EGFRIFNONA\", \"EGFRIFAFRI\", \"BCR\", \"K\", \"CO2\", \"CALCIUM\", \"PROTENTOTREF\", \"ALBUMIN\", \"LABIL2\", \"BILIRUBIN\", \"AST\", \"ALT\"  Lab Results   Component Value Date    THUAN Negative 04/16/2024      No results found for: \"CRP\"   No results found for: \"IRON\", \"TIBC\", \"FERRITIN\"   No results found for: " "\"[id number]\"       Assessment & Plan      Medications        Problem List         LOS    Health maintenance.  Doing well, vaccines current.  Discussed coated baby aspirin daily.  Discussed calcium and vitamin D.  Discussed health maintenance, screening test, and lifestyle modification.  Followed by OB/GYN Dr. Alina Rivera, mammogram current.  Uterine polyp.  Status post D&C , path benign, followed by OB/GYN.  SVT.  Clinically improved on Toprol currently, infrequent breakthrough episodes.  Avoid caffeine.   Follow-up recheck.  Stress testing/echo benign .  Followed by Dr. Linda, consider cardiology follow-up.  OA knee.  Persistent pain, stiffness.  History of Meniscus tear, status post repair per Ortho Dr. Oliveira.  Psoriasis.  Stable on topical Rx.  Menopausal syndrome.  Discussed calcium and vitamin D.  Followed by OB/GYN, mammogram current.  Colon cancer screening.  Colonoscopy per Alannah  with polypectomy by 1.  Repeat planned 5 years.  Hyperlipidemia.  Worse, mild - mod  elevation LDL to 149.  Has not been working on diet, restart.  tolerating fish oil/red yeast rice/ dose increased.  Discussed diet, exercise lifestyle modification.  Follow-up recheck labs 1 year.  Plan add statin if if persistent elevaiton  Family history of CVA.  Father, .  carotid Dopplers normal , continue risk factor reduction.  GERD. With dysphagia, improved today s/p EGD with dilation, back on ppi, recommmend continue indefinately, with barrents esophagus per her report will get records has gastroenterology follow up upcoming.  Consider further eval if if persistent symptoms Follow up recheck.   Upper respiratory infection.  With serous otitis.  Increase fluid intake, add antihistamine.  Start antibiotics in 3 to 4 days if symptoms persist.  Neck pain.  Significant right trapezius spasm.  Ice, NSAIDs, rehabilitation exercises discussed.  Add prednisone, nightly muscle relaxant.  Consider imaging if persistent " symptoms.  Lipoma.  Left flank, benign appearance, asymptomatic.  Will monitor clinically.  Consider surgery referral if worsening symptoms.      Diagnoses and all orders for this visit:    1. Back pain, unspecified back location, unspecified back pain laterality, unspecified chronicity (Primary)  -     C-reactive Protein  -     THUAN by IFA, Reflex to Titer and Pattern  -     Uric Acid  -     Rheumatoid Factor    2. Class 2 severe obesity due to excess calories with serious comorbidity and body mass index (BMI) of 35.0 to 35.9 in adult    3. History of tobacco use    4. Pain in other joint  -     predniSONE (DELTASONE) 5 MG tablet; 40mg x 3 days, 20mg x 3 days, 10mg x 3 days, 5mg x 3 days  Dispense: 45 tablet; Refill: 0  -     C-reactive Protein  -     THUAN by IFA, Reflex to Titer and Pattern  -     Uric Acid  -     Rheumatoid Factor    5. Supraventricular tachycardia    6. Mixed hyperlipidemia    7. Lipoma of torso              Expected course, medications, and adverse effects discussed.  Call or return if worsening or persistent symptoms.  I wore protective equipment throughout this patient encounter including a mask, gloves, and eye protection.  Hand hygiene was performed before donning protective equipment and after removal when leaving the room. The complete contents of the Assessment and Plan and Data/Lab Results as documented above have been reviewed and addressed by myself with the patient today as part of an ongoing evaluation / treatment plan.  If some of the documentation has been copied from a previous note and is unchanged it indicates that this problem / plan has been assessed today but is stable from a previous visit and no changes have been recommended.

## 2024-04-16 ENCOUNTER — OFFICE VISIT (OUTPATIENT)
Dept: FAMILY MEDICINE CLINIC | Facility: CLINIC | Age: 59
End: 2024-04-16
Payer: COMMERCIAL

## 2024-04-16 VITALS
WEIGHT: 211 LBS | OXYGEN SATURATION: 98 % | HEIGHT: 65 IN | RESPIRATION RATE: 18 BRPM | DIASTOLIC BLOOD PRESSURE: 80 MMHG | HEART RATE: 74 BPM | TEMPERATURE: 98.1 F | BODY MASS INDEX: 35.16 KG/M2 | SYSTOLIC BLOOD PRESSURE: 118 MMHG

## 2024-04-16 DIAGNOSIS — Z87.891 HISTORY OF TOBACCO USE: ICD-10-CM

## 2024-04-16 DIAGNOSIS — M54.9 BACK PAIN, UNSPECIFIED BACK LOCATION, UNSPECIFIED BACK PAIN LATERALITY, UNSPECIFIED CHRONICITY: Primary | ICD-10-CM

## 2024-04-16 DIAGNOSIS — E78.2 MIXED HYPERLIPIDEMIA: ICD-10-CM

## 2024-04-16 DIAGNOSIS — I47.10 SUPRAVENTRICULAR TACHYCARDIA: ICD-10-CM

## 2024-04-16 DIAGNOSIS — M25.59 PAIN IN OTHER JOINT: ICD-10-CM

## 2024-04-16 DIAGNOSIS — D17.1 LIPOMA OF TORSO: ICD-10-CM

## 2024-04-16 DIAGNOSIS — E66.01 CLASS 2 SEVERE OBESITY DUE TO EXCESS CALORIES WITH SERIOUS COMORBIDITY AND BODY MASS INDEX (BMI) OF 35.0 TO 35.9 IN ADULT: ICD-10-CM

## 2024-04-16 RX ORDER — PREDNISONE 5 MG/1
TABLET ORAL
Qty: 45 TABLET | Refills: 0 | Status: SHIPPED | OUTPATIENT
Start: 2024-04-16

## 2024-04-17 LAB
CRP SERPL-MCNC: <1 MG/L (ref 0–10)
RHEUMATOID FACT SERPL-ACNC: <10 IU/ML
URATE SERPL-MCNC: 4 MG/DL (ref 3–7.2)

## 2024-04-18 ENCOUNTER — TELEPHONE (OUTPATIENT)
Dept: FAMILY MEDICINE CLINIC | Facility: CLINIC | Age: 59
End: 2024-04-18
Payer: COMMERCIAL

## 2024-04-18 LAB — ANA SER QL IF: NEGATIVE

## 2024-04-18 NOTE — TELEPHONE ENCOUNTER
Patient is not on this mediation. What is she requesting this for? Starting a new medication would require an office visit.

## 2024-04-19 ENCOUNTER — TELEPHONE (OUTPATIENT)
Dept: FAMILY MEDICINE CLINIC | Facility: CLINIC | Age: 59
End: 2024-04-19
Payer: COMMERCIAL

## 2024-04-19 NOTE — TELEPHONE ENCOUNTER
LM for patient regarding ozempic not typically being covered through insurance without dx of diabetes or blood sugar issues. I let her know that [facility] does have a compound available and explained dosing and price. Patient to call to okay this being sent in    no

## 2024-04-19 NOTE — TELEPHONE ENCOUNTER
Patient requesting SEMAGLUTIDE be sent in to [facility] in [place] in? Patient states your recommended this at visit on 4/16/24?

## 2024-04-19 NOTE — TELEPHONE ENCOUNTER
----- Message from Corinna Hooks sent at 4/19/2024  8:37 AM EDT -----  Regarding: medication request  Contact: [phone]  Good morning, I did the other day. Dr Shane asked me about it. At first I said no but by the end of the visit I said I’d consider it so that’s why I called in yesterday.    Pt. extubated at 0750. Sats remain 100% on 3L NC. Pt not responding, but does withdraw to pain in all 4 ext.  Pt keeps BUE up and folded on chest.  Pt unable to take PO meds due to not being alert.  Pt b/p remained stable off of levophed for entire shift. Left PIV and right femoral arterial sheath removed. Normal saline infusing at 100ml/hr and protonix at 20ml/hr  at 20ml/hr. Pt had several black tarry bowel movements throughout the shift.  GI is aware and H/H remains stable. Urine output 75-100ml/hr. NSR on the cardiac monitor. VSS. POC reviewed with caretaker at bedside.

## 2024-04-19 NOTE — TELEPHONE ENCOUNTER
Sent Corinna carter my chart message advising to start new medication would need to be seen in office for evaluation

## 2024-04-22 ENCOUNTER — TELEPHONE (OUTPATIENT)
Dept: FAMILY MEDICINE CLINIC | Facility: CLINIC | Age: 59
End: 2024-04-22
Payer: COMMERCIAL

## 2024-04-22 DIAGNOSIS — M25.511 ACUTE PAIN OF RIGHT SHOULDER: Primary | ICD-10-CM

## 2024-04-22 NOTE — TELEPHONE ENCOUNTER
Caller: Corinna Hooks    Relationship: Self    Best call back number: [phone]     What orders are you requesting (i.e. lab or imaging): MRI RIGHT SHOULDER    In what timeframe would the patient need to come in:      Where will you receive your lab/imaging services:  [facility] [place], [place]    Additional notes: PATIENT CALLED STATED SHE WAS SEEN ON 4/16/24 WHICH DR JOYCE GIVE HER PREDNISONE BUT IT IS NOT HELPING HER.  PATIENT WANTS TO KNOW IF DR JOYCE WILL ORDER A MRI RIGHT SHOULDER.  PATIENT WANTS TO KNOW IF DR JOYCE CAN FAX HER THE ORDER -[phone],  DUE TO HER INSURANCE DOES NOT REQUIRE A PRIOR AUTH AND SHE WANTS TO GET IT DONE AS SOON AS POSSIBLE, DUE TO SO MUCH PAIN IN THE ARM.

## 2024-04-23 ENCOUNTER — TELEPHONE (OUTPATIENT)
Dept: FAMILY MEDICINE CLINIC | Facility: CLINIC | Age: 59
End: 2024-04-23
Payer: COMMERCIAL

## 2024-04-23 NOTE — TELEPHONE ENCOUNTER
Caller: Corinna Hooks    Relationship: Self    Best call back number:     [phone] (Mobile)       What was the call regarding: MRI   NEEDS PRIOR AUTHORIZATION FROM INSURANCE - PLEASE BEFORE SHE CAN SCHEDULE APPT    [facility] MRI IN [place]       Is it okay if the provider responds through MyChart:

## 2024-04-25 ENCOUNTER — TELEPHONE (OUTPATIENT)
Dept: FAMILY MEDICINE CLINIC | Facility: CLINIC | Age: 59
End: 2024-04-25
Payer: COMMERCIAL

## 2024-04-25 NOTE — TELEPHONE ENCOUNTER
Caller: Corinna Hooks    Relationship to patient: Self    Best call back number: [phone]    Patient is needing: SHE IS CALLING BACK TO CHECK ON THE PRE CERTIFICATION OF THE MRI SHE NEEDS TO GET.  HAS IT BEEN APPROVED YET?

## 2024-04-26 NOTE — TELEPHONE ENCOUNTER
Called and left message with Corinna. Advised that this is still under review and insurance anticipates determination by Monday or Tuesday. Advised patient if pain is worsening and significant go to ER for evaluation

## 2024-04-26 NOTE — TELEPHONE ENCOUNTER
Caller: Corinna Hooks    Relationship to patient: Self    Best call back number: [phone]    Patient is needing:     WOULD LIKE A CALL FOR A STATUS UPDATE.     PATIENTS SHOULDER IS KILLING HER.

## 2024-04-27 PROBLEM — D17.1 LIPOMA OF TORSO: Status: ACTIVE | Noted: 2024-04-27

## 2024-04-30 ENCOUNTER — TELEPHONE (OUTPATIENT)
Dept: FAMILY MEDICINE CLINIC | Facility: CLINIC | Age: 59
End: 2024-04-30
Payer: COMMERCIAL

## 2024-04-30 NOTE — TELEPHONE ENCOUNTER
Caller: IWONA    Relationship: Lab    Best call back number: [phone]     What is the best time to reach you: ANY    Who are you requesting to speak with (clinical staff, provider,  specific staff member): PCP    Do you know the name of the person who called:     What was the call regarding: NURSE AT  [facility] IS CALLING TO GET A PRIOR AUTHORIZATION FOR AN MRI OF RIGHT SHOULDER WITH AND WITHOUT CONTRAST ON THIS PATIENT. PLEASE FAX -[phone] AS SOON AD POSSIBLE. PATIENT'S APPOINTMENT IS ON 5/6/24 AT 9:30AM.    Is it okay if the provider responds through MyChart:

## 2024-05-02 ENCOUNTER — TELEPHONE (OUTPATIENT)
Dept: FAMILY MEDICINE CLINIC | Facility: CLINIC | Age: 59
End: 2024-05-02
Payer: COMMERCIAL

## 2024-05-02 DIAGNOSIS — M25.511 ACUTE PAIN OF RIGHT SHOULDER: Primary | ICD-10-CM

## 2024-05-02 NOTE — TELEPHONE ENCOUNTER
Caller: Corinna Hooks    Relationship: Self    Best call back number: [phone]      PATIENT NEEDS US TO CANCEL HER MRI ORDERS AND PUT A NEW ORDER IN THE SYSTEM FOR:  RIGHT SHOULDER MRI WO CONTRAST  SHE IS NOT GOING TO BE USING HER INSURANCE FOR THIS.       FAX NUMBER TO [facility] MRI IN [place]:  [phone]

## 2024-05-03 DIAGNOSIS — M25.511 ACUTE PAIN OF RIGHT SHOULDER: Primary | ICD-10-CM

## 2024-05-17 ENCOUNTER — TELEPHONE (OUTPATIENT)
Dept: FAMILY MEDICINE CLINIC | Facility: CLINIC | Age: 59
End: 2024-05-17
Payer: COMMERCIAL

## 2024-05-22 ENCOUNTER — TELEPHONE (OUTPATIENT)
Dept: FAMILY MEDICINE CLINIC | Facility: CLINIC | Age: 59
End: 2024-05-22
Payer: COMMERCIAL

## 2024-05-28 ENCOUNTER — TELEPHONE (OUTPATIENT)
Dept: FAMILY MEDICINE CLINIC | Facility: CLINIC | Age: 59
End: 2024-05-28
Payer: COMMERCIAL

## 2024-05-28 RX ORDER — SEMAGLUTIDE 0.68 MG/ML
0.25 INJECTION, SOLUTION SUBCUTANEOUS WEEKLY
Status: CANCELLED | OUTPATIENT
Start: 2024-05-28

## 2024-05-28 NOTE — TELEPHONE ENCOUNTER
Caller: Corinna Hooks    Relationship: Self    Best call back number: [phone]    Requested Prescriptions:   Requested Prescriptions     Pending Prescriptions Disp Refills    Semaglutide,0.25 or 0.5MG/DOS, (Ozempic, 0.25 or 0.5 MG/DOSE,) 2 MG/3ML solution pen-injector       Sig: Inject 0.25 mg under the skin into the appropriate area as directed 1 (One) Time Per Week.        Pharmacy where request should be sent:  [facility], SAME AS LAST TIME    Last office visit with prescribing clinician: 4/16/2024   Last telemedicine visit with prescribing clinician: Visit date not found   Next office visit with prescribing clinician: 1/15/2025     Additional details provided by patient: HAS ONE WEEK LEFT    Does the patient have less than a 3 day supply:  [] Yes  [x] No    Would you like a call back once the refill request has been completed: [] Yes [x] No    If the office needs to give you a call back, can they leave a voicemail: [] Yes [x] No    Nuno Gutierrez Rep   05/28/24 09:25 EDT

## 2024-06-04 ENCOUNTER — TELEPHONE (OUTPATIENT)
Dept: FAMILY MEDICINE CLINIC | Facility: CLINIC | Age: 59
End: 2024-06-04
Payer: COMMERCIAL

## 2024-06-04 ENCOUNTER — PATIENT MESSAGE (OUTPATIENT)
Dept: FAMILY MEDICINE CLINIC | Facility: CLINIC | Age: 59
End: 2024-06-04
Payer: COMMERCIAL

## 2024-06-04 NOTE — TELEPHONE ENCOUNTER
"Patient sent the following through [facility]:    \"  Good morning, my initial 4 weeks injections were 0.25 ml once weekly. I just rec’d my next 4 weeks’ doses and it’s the same compound and same dose 0.25 ml weekly. I thought the injections were to increase in strength?\"    Patient has been on Ozempic through [facility] since 4/19/2024. She is asking to increase to the 0.50mg doseage.   "

## 2024-06-24 ENCOUNTER — TELEPHONE (OUTPATIENT)
Dept: FAMILY MEDICINE CLINIC | Facility: CLINIC | Age: 59
End: 2024-06-24
Payer: COMMERCIAL

## 2024-06-24 NOTE — TELEPHONE ENCOUNTER
"Patient was given her results of MRI Right Shoulder of \"Let her know I was able to review her MRI, she does have several partial tears in the rotator cuff ligaments including 1 high-grade tear, it looks like the way her shoulder joint is shaped it causes her rotator cuff ligaments to rub over the bone and she is at increased risk of wear and tear in her shoulder ligaments, confirm that she is getting in with orthopedics\"    Patient sent the following message through [facility]:    \"Good morning, yes I have. He put me in PT and gave me a shot in my shoulder. None of which helped much. I am to schedule a follow up with him (Dr Tj Panda, [place])so I’ll do that when I return from vacation this week. It does feel better like the tears have healed some though. Thanks, Corinna\"    "

## 2024-06-24 NOTE — TELEPHONE ENCOUNTER
"Patient sent the follow through [facility]:    \"I just saw him once for that steroid shot. He didn’t make a big deal of my MRI results. He thought PT would strengthen my shoulder and loosen the impingement that was killing me. It still hurts but not as bad or as often. I just do PT at home now. I’ll follow up with the ortho doctor soon to see what he thinks about my cuff ligament basically getting shredded on spurs. [facility] Shane can view. Unless Dr Shane has other useful information for me:\"  "

## 2024-12-05 DIAGNOSIS — K21.9 GASTROESOPHAGEAL REFLUX DISEASE, UNSPECIFIED WHETHER ESOPHAGITIS PRESENT: ICD-10-CM

## 2024-12-06 RX ORDER — OMEPRAZOLE 40 MG/1
40 CAPSULE, DELAYED RELEASE ORAL DAILY
Qty: 90 CAPSULE | Refills: 3 | Status: SHIPPED | OUTPATIENT
Start: 2024-12-06

## 2024-12-13 ENCOUNTER — PATIENT MESSAGE (OUTPATIENT)
Dept: FAMILY MEDICINE CLINIC | Facility: CLINIC | Age: 59
End: 2024-12-13
Payer: COMMERCIAL

## 2025-01-08 DIAGNOSIS — I10 ESSENTIAL HYPERTENSION: Primary | ICD-10-CM

## 2025-01-08 DIAGNOSIS — Z00.01 ENCOUNTER FOR WELL ADULT EXAM WITH ABNORMAL FINDINGS: ICD-10-CM

## 2025-01-08 DIAGNOSIS — E78.2 MIXED HYPERLIPIDEMIA: ICD-10-CM

## 2025-01-09 DIAGNOSIS — L40.9 PSORIASIS: ICD-10-CM

## 2025-01-10 RX ORDER — FLUOCINONIDE 0.5 MG/G
1 CREAM TOPICAL EVERY 8 HOURS SCHEDULED
Qty: 30 G | Refills: 12 | Status: SHIPPED | OUTPATIENT
Start: 2025-01-10

## 2025-01-13 ENCOUNTER — TELEPHONE (OUTPATIENT)
Dept: FAMILY MEDICINE CLINIC | Facility: CLINIC | Age: 60
End: 2025-01-13
Payer: COMMERCIAL

## 2025-01-13 LAB
ALBUMIN SERPL-MCNC: 4.6 G/DL (ref 3.5–5)
ALBUMIN/GLOB SERPL: 1.9 G/DL
ALP SERPL-CCNC: 83 U/L (ref 38–126)
ALT SERPL W P-5'-P-CCNC: 22 U/L (ref 0–35)
ANION GAP SERPL CALCULATED.3IONS-SCNC: 11 MMOL/L
AST SERPL-CCNC: 27 U/L (ref 14–36)
BILIRUB SERPL-MCNC: 0.6 MG/DL (ref 0.2–1.3)
BUN BLD-MCNC: 14 MG/DL (ref 7–17)
BUN/CREAT SERPL: 17.5
CALCIUM SPEC-SCNC: 9.4 MG/DL (ref 8.4–10.2)
CHLORIDE SERPL-SCNC: 106 MMOL/L (ref 98–107)
CHOLEST SERPL-MCNC: 245 MG/DL (ref 0–200)
CO2 SERPL-SCNC: 28 MMOL/L (ref 22–30)
CREAT UR-MCNC: 0.8 MG/DL (ref 0.52–1.04)
GFR/BSA.PRED SERPLBLD CYS-BASED-ARV: 86.1 ML/MIN/{1.73_M2}
GLOBULIN UR ELPH-MCNC: 2.4 GM/DL
GLUCOSE P FAST SERPL-MCNC: 100 MG/DL (ref 74–106)
HDLC SERPL QL: 3.48
HDLC SERPL-MCNC: 70 MG/DL (ref 40–60)
LDL CHOL. (DIRECT): 156 MG/DL
POTASSIUM BLD-SCNC: 4.7 MMOL/L (ref 3.5–5.1)
PROT SERPL-MCNC: 7 G/DL (ref 6.3–8.2)
SODIUM BLD-SCNC: 140 MMOL/L (ref 137–145)
TRIGL SERPL-MCNC: 91 MG/DL (ref 0–150)
TSH SERPL DL<=0.05 MIU/L-ACNC: 1.36 UIU/ML (ref 0.47–4.68)

## 2025-01-14 NOTE — PROGRESS NOTES
Subjective   Corinna Hooks is a 59 y.o. female.     Chief Complaint   Patient presents with    Annual Exam    Hypertension    Hyperlipidemia       The patient is here: to discuss health maintenance and disease prevention to follow up on multiple medical problems.  Last comprehensive physical was on 12/27/2023.  Previous physical was performed by  Harris Shane MD  Overall has: moderate activity with work/home activities, exercises 2 - 3 times per week, good appetite, feels well with minor complaints, good energy level, and is sleeping well. Nutrition: balanced diet and eating a variety of foods. Last tetanus shot was  10/28/2013 . Patient is doing routine self breast exams: every 2 - 3 months    History of Present Illness  Patient presents to the office today for her annual wellness, reports a lesion on her right thumb for about 2 weeks now that she would like looked at.  Hypertension  This is a chronic problem. The current episode started more than 1 year ago. The problem is controlled. Pertinent negatives include no anxiety, chest pain, malaise/fatigue, palpitations or shortness of breath. Risk factors for coronary artery disease include obesity. Past treatments include nothing. Current antihypertension treatment includes beta blockers. There are no compliance problems.    Hyperlipidemia  This is a chronic problem. The current episode started more than 1 year ago. Recent lipid tests were reviewed and are high. Factors aggravating her hyperlipidemia include fatty foods. Pertinent negatives include no chest pain, leg pain, myalgias or shortness of breath. Current antihyperlipidemic treatment includes diet change and exercise. Risk factors for coronary artery disease include hypertension and obesity.        Recent Hospitalizations:  No hospitalization(s) within the last year..  ccc      I personally reviewed and updated the patient's allergies, medications, problem list, and past medical, surgical, social, and  family history. I have reviewed and confirmed the accuracy of the HPI and ROS as documented by the MA/LPN/RN Harris Shane MD    Family History   Problem Relation Age of Onset    Heart disease Mother     Diabetes Mother     Hyperlipidemia Mother     Tremor Father     Stroke Father     Heart disease Brother     Cancer Maternal Aunt         Pancreatic       Social History     Tobacco Use    Smoking status: Former     Current packs/day: 0.00     Average packs/day: 0.5 packs/day for 2.0 years (1.0 ttl pk-yrs)     Types: Cigarettes     Start date: 1987     Quit date: 1989     Years since quittin.0    Smokeless tobacco: Never    Tobacco comments:     quit in    Vaping Use    Vaping status: Never Used   Substance Use Topics    Alcohol use: Yes     Comment: Few glasses of wine about every 8-10 weeks    Drug use: Never       Past Surgical History:   Procedure Laterality Date    CARPAL TUNNEL RELEASE Right 2022    [facility]    CERVICAL CONIZATION      Comments: CARLOS III    CHOLECYSTECTOMY      COLONOSCOPY      D & C WITH SUCTION  2021    DILATATION AND CURETTAGE      Of the Uterus.    ENDOSCOPY  2023    JOINT REPLACEMENT  May 2022    Left TKR    KNEE SURGERY Left     SPINE SURGERY      Lumbar reduction    TUBAL ABDOMINAL LIGATION Bilateral        Patient Active Problem List   Diagnosis    Acute crisis reaction    Allergic rhinitis    Candidiasis of vulva and vagina    Colon cancer screening    Gastroesophageal reflux disease    Hyperlipidemia    Irritable bowel syndrome    Class 2 severe obesity due to excess calories with serious comorbidity and body mass index (BMI) of 35.0 to 35.9 in adult    Psoriasis    Supraventricular tachycardia    Tinnitus    Essential hypertension    History of tobacco use    Disorder of lacrimal system    Encounter for well adult exam with abnormal findings    Screening for malignant neoplasm of breast    Other dysphagia    Lipoma of torso          Current Outpatient Medications:     aspirin 81 MG chewable tablet, , Disp: , Rfl:     fluocinonide (LIDEX) 0.05 % cream, Apply 1 Application topically to the appropriate area as directed Every 8 (Eight) Hours., Disp: 30 g, Rfl: 12    metoprolol succinate XL (TOPROL-XL) 50 MG 24 hr tablet, Take 1 tablet by mouth Daily., Disp: 90 tablet, Rfl: 3    omeprazole (priLOSEC) 40 MG capsule, TAKE 1 CAPSULE BY MOUTH EVERY DAY, Disp: 90 capsule, Rfl: 3    Red Yeast Rice Extract (RED YEAST RICE PO), Take 2,400 mg by mouth Daily. Dr Shane recommended 2400mg daily 12/27/23, Disp: , Rfl:     cephalexin (KEFLEX) 500 MG capsule, Take 1 capsule by mouth 3 (Three) Times a Day., Disp: 30 capsule, Rfl: 0    Semaglutide (OZEMPIC, 0.25 OR 0.5 MG/DOSE, SC), Inject 0.25 mg under the skin into the appropriate area as directed 1 (One) Time Per Week. (Patient not taking: Reported on 1/15/2025), Disp: , Rfl:     Review of Systems   Constitutional:  Negative for chills, diaphoresis and malaise/fatigue.   HENT:  Negative for trouble swallowing and voice change.    Eyes:  Negative for visual disturbance.   Respiratory:  Negative for shortness of breath.    Cardiovascular:  Negative for chest pain and palpitations.   Gastrointestinal:  Negative for abdominal pain and nausea.   Endocrine: Negative for polydipsia and polyphagia.   Genitourinary:  Negative for hematuria.   Musculoskeletal:  Negative for myalgias and neck stiffness.   Skin:  Negative for color change and pallor.   Allergic/Immunologic: Negative for immunocompromised state.   Neurological:  Negative for seizures and syncope.   Hematological:  Negative for adenopathy.   Psychiatric/Behavioral:  Negative for hallucinations, sleep disturbance and suicidal ideas.        I have reviewed and confirmed the accuracy of the ROS as documented by the MA/LPN/RN Harris Shane MD      Objective   /80 (BP Location: Right arm, Patient Position: Sitting, Cuff Size: Adult)   Pulse 100   " Temp 98 °F (36.7 °C) (Temporal)   Resp 18   Ht 163.8 cm (64.5\")   Wt 92.4 kg (203 lb 9.6 oz)   LMP 12/04/2019   SpO2 96% Comment: room air  BMI 34.41 kg/m²   BP Readings from Last 3 Encounters:   01/15/25 122/80   04/16/24 118/80   12/27/23 130/78     Wt Readings from Last 3 Encounters:   01/15/25 92.4 kg (203 lb 9.6 oz)   04/16/24 95.7 kg (211 lb)   12/27/23 98.2 kg (216 lb 6.4 oz)     Physical Exam  Constitutional:       Appearance: Normal appearance. She is well-developed. She is not diaphoretic.   HENT:      Head: Normocephalic and atraumatic.      Right Ear: Tympanic membrane, ear canal and external ear normal.      Left Ear: Tympanic membrane, ear canal and external ear normal.      Nose: Nose normal.      Mouth/Throat:      Mouth: Mucous membranes are moist.   Eyes:      General: Lids are normal.      Extraocular Movements: Extraocular movements intact.      Conjunctiva/sclera: Conjunctivae normal.      Pupils: Pupils are equal, round, and reactive to light.   Neck:      Thyroid: No thyromegaly.      Vascular: No carotid bruit or JVD.      Trachea: No tracheal deviation.   Cardiovascular:      Rate and Rhythm: Normal rate and regular rhythm.      Heart sounds: Normal heart sounds. No murmur heard.     No friction rub. No gallop.   Pulmonary:      Effort: Pulmonary effort is normal.      Breath sounds: Normal breath sounds. No stridor. No decreased breath sounds, wheezing or rales.   Abdominal:      General: Bowel sounds are normal. There is no distension.      Palpations: Abdomen is soft. There is no mass.      Tenderness: There is no abdominal tenderness. There is no guarding or rebound.      Hernia: No hernia is present.   Lymphadenopathy:      Head:      Right side of head: No submental, submandibular, tonsillar, preauricular, posterior auricular or occipital adenopathy.      Left side of head: No submental, submandibular, tonsillar, preauricular, posterior auricular or occipital adenopathy.      " "Cervical: No cervical adenopathy.   Skin:     General: Skin is warm and dry.      Coloration: Skin is not pale.   Neurological:      Mental Status: She is alert and oriented to person, place, and time.      Cranial Nerves: No cranial nerve deficit.      Sensory: No sensory deficit.      Coordination: Coordination normal.      Gait: Gait normal.      Deep Tendon Reflexes: Reflexes are normal and symmetric.         Data / Lab Results:    Hemoglobin A1C   Date Value Ref Range Status   04/28/2022 5.2 4.0 - 5.6 % Final     Lab Results   Component Value Date    Glucose, Fasting 100 01/13/2025    Glucose, UA Negative 12/27/2023     Lab Results   Component Value Date     01/13/2025     (H) 12/19/2023     Lab Results   Component Value Date    CHOL 245 (A) 01/13/2025     Lab Results   Component Value Date    TRIG 91 01/13/2025    TRIG 68 12/19/2023     Lab Results   Component Value Date    HDL 70 (A) 01/13/2025    HDL 68 12/19/2023     No results found for: \"PSA\"  Lab Results   Component Value Date    WBC 6.5 12/19/2023    HGB 13.9 12/19/2023    HCT 44.5 12/19/2023    MCV 88.5 12/19/2023     12/19/2023     Lab Results   Component Value Date    TSH 1.360 01/13/2025     Lab Results   Component Value Date    BUN 14 01/13/2025    BCR 17.5 01/13/2025    K 4.7 01/13/2025    CO2 28.0 01/13/2025    CALCIUM 9.4 01/13/2025    ALBUMIN 4.6 01/13/2025    AST 27 01/13/2025    ALT 22 01/13/2025     Lab Results   Component Value Date    THUAN Negative 04/16/2024      No results found for: \"CRP\"   No results found for: \"IRON\", \"TIBC\", \"FERRITIN\"   No results found for: \"[id number]\"     Age-appropriate Screening Schedule:  Refer to the list below for future screening recommendations based on patient's age, sex and/or medical conditions. Orders for these recommended tests are listed in the plan section. The patient has been provided with a written plan.    Health Maintenance   Topic Date Due    ZOSTER VACCINE (1 of 2) Never " done    HEPATITIS C SCREENING  Never done    TDAP/TD VACCINES (2 - Td or Tdap) 10/28/2023    COVID-19 Vaccine (3 -  season) 2025 (Originally 2024)    INFLUENZA VACCINE  2025 (Originally 2024)    PAP SMEAR  2025    LIPID PANEL  2026    ANNUAL PHYSICAL  01/15/2026    BMI FOLLOWUP  01/15/2026    MAMMOGRAM  11/15/2026    COLORECTAL CANCER SCREENING  2029    Pneumococcal Vaccine 0-64  Aged Out           Assessment & Plan      Medications        Problem List         LOS    Physical.  Doing well, vaccines current.  Discussed coated baby aspirin daily.  Discussed calcium and vitamin D.  Discussed health maintenance, screening test, and lifestyle modification.  Followed by OB/GYN Dr. Alina Rivera, mammogram current.  Uterine polyp.  Status post D&C , path benign, followed by OB/GYN.  SVT.  Clinically improved on Toprol currently, infrequent breakthrough episodes.  Avoid caffeine.   Follow-up recheck.  Stress testing/echo benign .  Followed by Dr. Linda, consider cardiology follow-up.  Plan low-dose CT chest/vascular screening bundle next year.  OA knee.  Persistent pain, stiffness.  History of Meniscus tear, status post repair per Ortho Dr. Oliveira.  Psoriasis.  Stable on topical Rx.  Menopausal syndrome.  Discussed calcium and vitamin D.  Followed by OB/GYN, mammogram current.  Colon cancer screening.  Colonoscopy per Alannah  with polypectomy by 1.  Repeat upcoming per gastroenterology at [facility]..  Hyperlipidemia.  Persistent elevation today LDL to 156.  HDL high at 70..  Has not been working on diet, restart.  tolerating fish oil/red yeast rice/ dose increased.  Discussed diet, exercise lifestyle modification.  Follow-up recheck labs 6 months..  Plan add statin if if persistent elevaiton  Family history of CVA.  Father, .  carotid Dopplers normal , continue risk factor reduction.  GERD. With dysphagia, improved today s/p EGD with dilation, back on  ppi, recommmend continue indefinately, with barrents esophagus per her report will get records has gastroenterology follow up upcoming.  Consider further eval if if persistent symptoms Follow up recheck.  Neck pain.  Significant right trapezius spasm.  Ice, NSAIDs, rehabilitation exercises discussed.  Add prednisone, nightly muscle relaxant.  Consider imaging if persistent symptoms.  Lipoma.  Left flank, benign appearance, asymptomatic.  Will monitor clinically.  Consider surgery referral if worsening symptoms.  Cellulitis thumb.  Start antibiotics.  Call or return if fever worsening symptoms.    Previous Exams:  Mammogram was on 11/15/2024 ordered by Dr Shane.     Findings in both breast are benign negative    BI-RADS Category 2: Benign Findings    Recommended repeat in 1 year  Colonoscopy was on 4/4/2019 by Dr Jaffe.     Grade/Stage II internal hemorrhoids    Polyp (4 mm) in the distal ascending colon (polypectomy)  US Carotid was 1/12/2023     Normal carotid duplex as indicated by velocities, laminar color flow and lack of plaque formation.     EKG was on 7/14/2021      Diagnoses and all orders for this visit:    1. Encounter for annual general medical examination with abnormal findings in adult (Primary)    2. Essential hypertension  -     Comprehensive Metabolic Panel; Future  -     Lipid Panel With / Chol / HDL Ratio; Future    3. Mixed hyperlipidemia  -     Comprehensive Metabolic Panel; Future  -     Lipid Panel With / Chol / HDL Ratio; Future    4. Class 2 severe obesity due to excess calories with serious comorbidity and body mass index (BMI) of 35.0 to 35.9 in adult    5. History of tobacco use    6. Acute URI    7. Skin lesion of hand  -     cephalexin (KEFLEX) 500 MG capsule; Take 1 capsule by mouth 3 (Three) Times a Day.  Dispense: 30 capsule; Refill: 0    8. Supraventricular tachycardia    9. Gastroesophageal reflux disease, unspecified whether esophagitis present    10. Psoriasis  -      fluocinonide (LIDEX) 0.05 % cream; Apply 1 Application topically to the appropriate area as directed Every 8 (Eight) Hours.  Dispense: 30 g; Refill: 12      Class 2 Severe Obesity (BMI >=35 and <=39.9). Obesity-related health conditions include the following: hypertension and dyslipidemias. Obesity is unchanged. BMI is is above average; BMI management plan is completed. We discussed portion control and increasing exercise.          Expected course, medications, and adverse effects discussed.  Call or return if worsening or persistent symptoms.  I wore protective equipment throughout this patient encounter including a mask, gloves, and eye protection.  Hand hygiene was performed before donning protective equipment and after removal when leaving the room. The complete contents of the Assessment and Plan and Data / Lab Results as documented above have been reviewed and addressed by myself with the patient today as part of an ongoing evaluation / treatment plan.  If some of the documentation has been copied from a previous note and is unchanged it indicates that this problem / plan has been assessed today but is stable from a previous visit and no changes have been recommended.  The separate E/M service provided today is significant, medically necessary, and separately identifiable.

## 2025-01-15 ENCOUNTER — OFFICE VISIT (OUTPATIENT)
Dept: FAMILY MEDICINE CLINIC | Facility: CLINIC | Age: 60
End: 2025-01-15
Payer: COMMERCIAL

## 2025-01-15 VITALS
BODY MASS INDEX: 33.92 KG/M2 | TEMPERATURE: 98 F | WEIGHT: 203.6 LBS | RESPIRATION RATE: 18 BRPM | HEART RATE: 100 BPM | DIASTOLIC BLOOD PRESSURE: 80 MMHG | OXYGEN SATURATION: 96 % | SYSTOLIC BLOOD PRESSURE: 122 MMHG | HEIGHT: 65 IN

## 2025-01-15 DIAGNOSIS — J06.9 ACUTE URI: ICD-10-CM

## 2025-01-15 DIAGNOSIS — K21.9 GASTROESOPHAGEAL REFLUX DISEASE, UNSPECIFIED WHETHER ESOPHAGITIS PRESENT: ICD-10-CM

## 2025-01-15 DIAGNOSIS — E66.812 CLASS 2 SEVERE OBESITY DUE TO EXCESS CALORIES WITH SERIOUS COMORBIDITY AND BODY MASS INDEX (BMI) OF 35.0 TO 35.9 IN ADULT: ICD-10-CM

## 2025-01-15 DIAGNOSIS — Z87.891 HISTORY OF TOBACCO USE: ICD-10-CM

## 2025-01-15 DIAGNOSIS — I10 ESSENTIAL HYPERTENSION: ICD-10-CM

## 2025-01-15 DIAGNOSIS — E66.01 CLASS 2 SEVERE OBESITY DUE TO EXCESS CALORIES WITH SERIOUS COMORBIDITY AND BODY MASS INDEX (BMI) OF 35.0 TO 35.9 IN ADULT: ICD-10-CM

## 2025-01-15 DIAGNOSIS — E78.2 MIXED HYPERLIPIDEMIA: ICD-10-CM

## 2025-01-15 DIAGNOSIS — L98.9 SKIN LESION OF HAND: ICD-10-CM

## 2025-01-15 DIAGNOSIS — I47.10 SUPRAVENTRICULAR TACHYCARDIA: ICD-10-CM

## 2025-01-15 DIAGNOSIS — L40.9 PSORIASIS: ICD-10-CM

## 2025-01-15 DIAGNOSIS — Z00.01 ENCOUNTER FOR ANNUAL GENERAL MEDICAL EXAMINATION WITH ABNORMAL FINDINGS IN ADULT: Primary | ICD-10-CM

## 2025-01-15 PROCEDURE — 99396 PREV VISIT EST AGE 40-64: CPT | Performed by: FAMILY MEDICINE

## 2025-01-15 PROCEDURE — 99213 OFFICE O/P EST LOW 20 MIN: CPT | Performed by: FAMILY MEDICINE

## 2025-01-15 RX ORDER — FLUOCINONIDE 0.5 MG/G
1 CREAM TOPICAL EVERY 8 HOURS SCHEDULED
Qty: 30 G | Refills: 12 | Status: SHIPPED | OUTPATIENT
Start: 2025-01-15

## 2025-01-15 RX ORDER — CEPHALEXIN 500 MG/1
500 CAPSULE ORAL 3 TIMES DAILY
Qty: 30 CAPSULE | Refills: 0 | Status: SHIPPED | OUTPATIENT
Start: 2025-01-15

## 2025-02-12 ENCOUNTER — OFFICE VISIT (OUTPATIENT)
Dept: FAMILY MEDICINE CLINIC | Facility: CLINIC | Age: 60
End: 2025-02-12
Payer: COMMERCIAL

## 2025-02-12 VITALS
WEIGHT: 207 LBS | HEIGHT: 64 IN | HEART RATE: 87 BPM | RESPIRATION RATE: 20 BRPM | OXYGEN SATURATION: 98 % | SYSTOLIC BLOOD PRESSURE: 132 MMHG | DIASTOLIC BLOOD PRESSURE: 82 MMHG | BODY MASS INDEX: 35.34 KG/M2

## 2025-02-12 DIAGNOSIS — U07.1 COVID-19 VIRUS INFECTION: ICD-10-CM

## 2025-02-12 DIAGNOSIS — R50.9 FEVER, UNSPECIFIED FEVER CAUSE: Primary | ICD-10-CM

## 2025-02-12 LAB
EXPIRATION DATE: ABNORMAL
FLUAV AG UPPER RESP QL IA.RAPID: NOT DETECTED
FLUBV AG UPPER RESP QL IA.RAPID: NOT DETECTED
INTERNAL CONTROL: ABNORMAL
Lab: ABNORMAL
SARS-COV-2 AG UPPER RESP QL IA.RAPID: DETECTED

## 2025-02-12 PROCEDURE — 87428 SARSCOV & INF VIR A&B AG IA: CPT | Performed by: FAMILY MEDICINE

## 2025-02-12 PROCEDURE — 99213 OFFICE O/P EST LOW 20 MIN: CPT | Performed by: FAMILY MEDICINE

## 2025-02-12 RX ORDER — PREDNISONE 5 MG/1
TABLET ORAL
COMMUNITY
Start: 2025-02-10

## 2025-02-12 NOTE — PROGRESS NOTES
Subjective   Corinna Hooks is a 60 y.o. female.   Chief Complaint   Patient presents with    URI     URI   This is a new problem. The current episode started in the past 7 days. The problem has been unchanged. The maximum temperature recorded prior to her arrival was 100.4 - 100.9 F. The fever has been present for 1 to 2 days. Associated symptoms include abdominal pain, congestion, coughing, headaches, nausea, sinus pain and wheezing. She has tried acetaminophen, decongestant and antihistamine for the symptoms. The treatment provided no relief.        The following portions of the patient's history were reviewed and updated as appropriate: allergies, current medications, past family history, past medical history, past social history, past surgical history, and problem list.    Patient Active Problem List   Diagnosis    Acute crisis reaction    Allergic rhinitis    Candidiasis of vulva and vagina    Colon cancer screening    Gastroesophageal reflux disease    Hyperlipidemia    Irritable bowel syndrome    Class 2 severe obesity due to excess calories with serious comorbidity and body mass index (BMI) of 35.0 to 35.9 in adult    Psoriasis    Supraventricular tachycardia    Tinnitus    Essential hypertension    History of tobacco use    Disorder of lacrimal system    Encounter for well adult exam with abnormal findings    Screening for malignant neoplasm of breast    Other dysphagia    Lipoma of torso       Current Outpatient Medications on File Prior to Visit   Medication Sig Dispense Refill    aspirin 81 MG chewable tablet       cephalexin (KEFLEX) 500 MG capsule Take 1 capsule by mouth 3 (Three) Times a Day. 30 capsule 0    fluocinonide (LIDEX) 0.05 % cream Apply 1 Application topically to the appropriate area as directed Every 8 (Eight) Hours. 30 g 12    metoprolol succinate XL (TOPROL-XL) 50 MG 24 hr tablet Take 1 tablet by mouth Daily. 90 tablet 3    omeprazole (priLOSEC) 40 MG capsule TAKE 1 CAPSULE BY MOUTH  "EVERY DAY 90 capsule 3    predniSONE (DELTASONE) 5 MG tablet       Red Yeast Rice Extract (RED YEAST RICE PO) Take 2,400 mg by mouth Daily. Dr Shane recommended 2400mg daily 12/27/23      Semaglutide (OZEMPIC, 0.25 OR 0.5 MG/DOSE, SC) Inject 0.25 mg under the skin into the appropriate area as directed 1 (One) Time Per Week. (Patient not taking: Reported on 2/12/2025)       No current facility-administered medications on file prior to visit.     Current outpatient and discharge medications have been reconciled for the patient.  Reviewed by: Forrest Lopes MD      Allergies   Allergen Reactions    Acetaminophen-Codeine Rash and GI Intolerance    Hydrocodone-Acetaminophen GI Intolerance    Oxycodone-Acetaminophen Rash and GI Intolerance       Review of Systems   HENT:  Positive for congestion.    Respiratory:  Positive for cough and wheezing.    Gastrointestinal:  Positive for abdominal pain and nausea.   All other systems reviewed and are negative.    I have reviewed and confirmed the accuracy of the ROS as documented by the MA/LPN/RN Forrest Lopes MD    Objective   Visit Vitals  /82 (BP Location: Right arm, Patient Position: Sitting, Cuff Size: Adult)   Pulse 87   Resp 20   Ht 163.8 cm (64.49\")   Wt 93.9 kg (207 lb)   LMP 12/04/2019   SpO2 98%   BMI 35.00 kg/m²      **  Physical Exam  Constitutional:       Appearance: She is well-developed.   HENT:      Head: Normocephalic and atraumatic.      Right Ear: External ear normal.      Left Ear: External ear normal.      Nose: Nose normal.   Eyes:      Pupils: Pupils are equal, round, and reactive to light.   Cardiovascular:      Rate and Rhythm: Normal rate and regular rhythm.      Heart sounds: Normal heart sounds.   Pulmonary:      Effort: Pulmonary effort is normal.      Breath sounds: Normal breath sounds.   Abdominal:      General: Bowel sounds are normal.      Palpations: Abdomen is soft.   Musculoskeletal:         General: Normal range of " motion.      Cervical back: Normal range of motion and neck supple.   Skin:     General: Skin is warm and dry.   Neurological:      Mental Status: She is alert and oriented to person, place, and time.   Psychiatric:         Behavior: Behavior normal.         Thought Content: Thought content normal.         Judgment: Judgment normal.       Derm Physical Exam  Ortho Exam   Neurological Exam  Mental Status  Alert. Oriented to person, place, and time.    Cranial Nerves  CN III, IV, VI: Pupils equal round and reactive to light bilaterally.         Assessment & Plan  Fever, unspecified fever cause    Orders:    POCT SARS-CoV-2 + Flu Antigen EFE    Nirmatrelvir & Ritonavir, 300mg/100mg, (PAXLOVID); Take 3 tablets by mouth 2 (Two) Times a Day.    COVID-19 virus infection    Orders:    Nirmatrelvir & Ritonavir, 300mg/100mg, (PAXLOVID); Take 3 tablets by mouth 2 (Two) Times a Day.       Findings discussed. All questions answered.  Medication and medication adverse effects discussed.  Drug education given and explained to patient. Patient verbalized understanding.  Follow-up for routine health maintenance as indicated.  Follow-up in 1 week if not better.  Follow-up sooner for worsening symptoms or for any concerns.  Follow-up for routine health maintenance as indicated.         Expected course, medications, and adverse effects discussed as appropriate.  Call or return if worsening or persistent symptoms.     This document is intended for medical professional use only.   Electronically signed by Forrest Lopes MD on 02/12/2025. This content may not have been proofread.

## 2025-03-14 DIAGNOSIS — I10 ESSENTIAL HYPERTENSION: ICD-10-CM

## 2025-03-14 RX ORDER — METOPROLOL SUCCINATE 50 MG/1
50 TABLET, EXTENDED RELEASE ORAL DAILY
Qty: 90 TABLET | Refills: 3 | Status: SHIPPED | OUTPATIENT
Start: 2025-03-14

## 2025-07-30 ENCOUNTER — TELEPHONE (OUTPATIENT)
Dept: FAMILY MEDICINE CLINIC | Facility: CLINIC | Age: 60
End: 2025-07-30
Payer: COMMERCIAL

## 2025-07-30 ENCOUNTER — PATIENT MESSAGE (OUTPATIENT)
Dept: FAMILY MEDICINE CLINIC | Facility: CLINIC | Age: 60
End: 2025-07-30
Payer: COMMERCIAL

## 2025-07-30 NOTE — TELEPHONE ENCOUNTER
"Patient sent the following:    \"Hi Dr Shane, I’m messaging you to ask if you could take on my  as your patient? He currently has a very old doctor in [place] that isn’t helping Juan. Juan has had chest pains, probably from a muscle pull, but lightheadedness & dizziness for 2 months now. Labs show elevated cholesterol so he was started on Crestor. He’s still having chest pains a few times a week. I know to get him to the ER if these escalate but he hadn’t even had an EKG. I was told you aren’t taking new patients but I wanted to reach out. Thanks! Corinna\"  "

## 2025-07-30 NOTE — TELEPHONE ENCOUNTER
Okay to schedule him in to be seen, if his symptoms worsen before he can get into the office should go to the ER, thanks

## 2025-08-19 ENCOUNTER — OFFICE VISIT (OUTPATIENT)
Dept: FAMILY MEDICINE CLINIC | Facility: CLINIC | Age: 60
End: 2025-08-19
Payer: COMMERCIAL

## 2025-08-19 VITALS
HEART RATE: 79 BPM | WEIGHT: 211.8 LBS | TEMPERATURE: 98.4 F | RESPIRATION RATE: 18 BRPM | BODY MASS INDEX: 36.16 KG/M2 | SYSTOLIC BLOOD PRESSURE: 138 MMHG | DIASTOLIC BLOOD PRESSURE: 90 MMHG | OXYGEN SATURATION: 97 % | HEIGHT: 64 IN

## 2025-08-19 DIAGNOSIS — R53.83 MALAISE AND FATIGUE: ICD-10-CM

## 2025-08-19 DIAGNOSIS — E66.812 CLASS 2 SEVERE OBESITY DUE TO EXCESS CALORIES WITH SERIOUS COMORBIDITY AND BODY MASS INDEX (BMI) OF 35.0 TO 35.9 IN ADULT: ICD-10-CM

## 2025-08-19 DIAGNOSIS — I47.10 SUPRAVENTRICULAR TACHYCARDIA: ICD-10-CM

## 2025-08-19 DIAGNOSIS — E78.2 MIXED HYPERLIPIDEMIA: ICD-10-CM

## 2025-08-19 DIAGNOSIS — Z87.891 HISTORY OF TOBACCO USE: ICD-10-CM

## 2025-08-19 DIAGNOSIS — E66.01 CLASS 2 SEVERE OBESITY DUE TO EXCESS CALORIES WITH SERIOUS COMORBIDITY AND BODY MASS INDEX (BMI) OF 35.0 TO 35.9 IN ADULT: ICD-10-CM

## 2025-08-19 DIAGNOSIS — I10 ESSENTIAL HYPERTENSION: Primary | ICD-10-CM

## 2025-08-19 DIAGNOSIS — R53.81 MALAISE AND FATIGUE: ICD-10-CM

## 2025-08-19 DIAGNOSIS — K21.9 GASTROESOPHAGEAL REFLUX DISEASE, UNSPECIFIED WHETHER ESOPHAGITIS PRESENT: ICD-10-CM

## 2025-08-19 DIAGNOSIS — Z00.01 ENCOUNTER FOR ANNUAL GENERAL MEDICAL EXAMINATION WITH ABNORMAL FINDINGS IN ADULT: ICD-10-CM

## 2025-08-19 PROCEDURE — 99214 OFFICE O/P EST MOD 30 MIN: CPT | Performed by: FAMILY MEDICINE
